# Patient Record
Sex: FEMALE | Race: WHITE | HISPANIC OR LATINO | Employment: UNEMPLOYED | URBAN - METROPOLITAN AREA
[De-identification: names, ages, dates, MRNs, and addresses within clinical notes are randomized per-mention and may not be internally consistent; named-entity substitution may affect disease eponyms.]

---

## 2022-07-12 ENCOUNTER — GYNECOLOGY VISIT (OUTPATIENT)
Dept: OBGYN | Facility: CLINIC | Age: 32
End: 2022-07-12

## 2022-07-12 ENCOUNTER — APPOINTMENT (OUTPATIENT)
Dept: OBGYN | Facility: CLINIC | Age: 32
End: 2022-07-12

## 2022-07-12 VITALS — SYSTOLIC BLOOD PRESSURE: 114 MMHG | DIASTOLIC BLOOD PRESSURE: 70 MMHG | WEIGHT: 188.8 LBS

## 2022-07-12 DIAGNOSIS — N93.8 DUB (DYSFUNCTIONAL UTERINE BLEEDING): ICD-10-CM

## 2022-07-12 LAB
INT CON NEG: NEGATIVE
INT CON POS: POSITIVE
POC URINE PREGNANCY TEST: POSITIVE

## 2022-07-12 PROCEDURE — 81025 URINE PREGNANCY TEST: CPT | Performed by: OBSTETRICS & GYNECOLOGY

## 2022-07-12 PROCEDURE — 99203 OFFICE O/P NEW LOW 30 MIN: CPT | Performed by: OBSTETRICS & GYNECOLOGY

## 2022-07-12 NOTE — PROGRESS NOTES
Patient here for GYN/DUB.  UPT= positive  LMP= 04/24/2022  RAYO= 01/29/2023  GA= 11w2d  Last pap patient states about 1 year ago at the [facility] [place]  Phone number:   Pharmacy verified  C/o patient states no concerns at the moment.

## 2022-07-12 NOTE — PROGRESS NOTES
Cc: Confirmation of pregnancy    HPI:  The patient is a 32 y.o.  at 11 weeks 2 days by last menstrual cycle of 2022.  She has a history of what appears to be 1 prior spontaneous  delivery at 32 weeks gestation.  This occurred 13 years ago.      She presents for a confirmation of pregnancy.  She denies  fetal movement,  denies  vaginal bleeding,  denies  leakage of fluid,  denies contractions.   She denies nausea/vomiting, denies headache, and denies dysuria.      Review of systems:  Pertinent positives documented in HPI and all other systems reviewed & are negative    OB History    Para Term  AB Living   2 1   1   1   SAB IAB Ectopic Molar Multiple Live Births             1      # Outcome Date GA Lbr Luis/2nd Weight Sex Delivery Anes PTL Lv   2 Current            1  09 32w0d  2 kg (4 lb 6.6 oz) M Vag-Spont   CHIOMA     History reviewed. No pertinent past medical history.  History reviewed. No pertinent surgical history.  Social History     Socioeconomic History   • Marital status:      Spouse name: Not on file   • Number of children: Not on file   • Years of education: Not on file   • Highest education level: Not on file   Occupational History   • Not on file   Tobacco Use   • Smoking status: Never Smoker   • Smokeless tobacco: Never Used   Vaping Use   • Vaping Use: Never used   Substance and Sexual Activity   • Alcohol use: Never   • Drug use: Never   • Sexual activity: Yes     Partners: Male   Other Topics Concern   • Not on file   Social History Narrative   • Not on file     Social Determinants of Health     Financial Resource Strain: Not on file   Food Insecurity: Not on file   Transportation Needs: Not on file   Physical Activity: Not on file   Stress: Not on file   Social Connections: Not on file   Intimate Partner Violence: Not on file   Housing Stability: Not on file     Family History   Problem Relation Age of Onset   • Diabetes Mother    • No Known Problems  Father    • No Known Problems Sister    • No Known Problems Brother    • No Known Problems Brother    • No Known Problems Brother    • No Known Problems Brother      Allergies:   Allergies as of 2022   • (No Known Allergies)       PE:    /70   Wt 85.6 kg (188 lb 12.8 oz)       General:appears stated age, is in no apparent distress  Head: normocephalic, non-tender  Neck: no jugular venous distension  Abdomen: Bowel sounds positive, nondistended, soft, nontender x4, no rebound or guarding. No organomegaly. No masses.  Female GYN: normal external genitalia, no erythema, no discharge  Skin: No rashes, or ulcers or lesions seen  Psychiatric: Patient shows appropriate affect, is alert and oriented x3, intact judgment and insight.    Transvaginal US performed and per my read:    Indication: Dating.     Findings: ott intrauterine pregnancy @11 weeks by CRL. Positive yolk sac. Positive fetal cardiac activity @170 BPM. Right ovary normal. Left Ovary normal. Cervical length 4.1 cm. No free fluid in the cul-de-sac.    Impression: viable IUP @11 weeks. EDC by US of 2023      A/P:   1. DUB (dysfunctional uterine bleeding)  POCT Pregnancy       1. Spent 30 minutes in face-to-face patient contact in which greater than 50% of that visit was spent in counseling/coordination of care of newly diagnosed pregnancy including medical and surgical options of care.  2.  We will discuss trimester screening at next visit  3.  SAB precautions discussed  4.  F/u in 3 weeks for new OB visit  5.  Increase water intake and encouraged healthy nutrition.  6.  Begin prenatal vitamins.    Referral to [facility] made secondary to history of spontaneous  birth.  May be candidate for progesterone supplementation during this pregnancy    Final due date 2023, consistent with today's ultrasound and last menstrual cycle

## 2022-07-14 ENCOUNTER — OFFICE VISIT (OUTPATIENT)
Dept: OBGYN | Facility: CLINIC | Age: 32
End: 2022-07-14

## 2022-08-12 ENCOUNTER — INITIAL PRENATAL (OUTPATIENT)
Dept: OBGYN | Facility: CLINIC | Age: 32
End: 2022-08-12

## 2022-08-12 VITALS
SYSTOLIC BLOOD PRESSURE: 98 MMHG | WEIGHT: 187.2 LBS | DIASTOLIC BLOOD PRESSURE: 70 MMHG | HEIGHT: 61 IN | BODY MASS INDEX: 35.34 KG/M2

## 2022-08-12 DIAGNOSIS — O09.92 SUPERVISION OF HIGH RISK PREGNANCY IN SECOND TRIMESTER: ICD-10-CM

## 2022-08-12 PROCEDURE — 8198 PREG CTR PKG RATE (SYSTEM)

## 2022-08-12 PROCEDURE — 0500F INITIAL PRENATAL CARE VISIT: CPT

## 2022-08-12 NOTE — PROGRESS NOTES
CC: New Ob visit     Subjective Christie Ang  15w5d by LMP= 11w US presents for prenatal care. Today is her first prenatal visit at [facility].   I have reviewed the patients' medical, surgical, gynecological, obstetrical, social, and family histories, medications and available lab results and pertinent notes are as follows:   no ER visits  No previous care in this pregnancy.   She has no complaints today.    Genetic screening options: Declines AFP.  Declines carrier screening. Declines NIPTs.    Reports no FM yet in the pregnancy. Denies VB, LOF, or cramping.  Denies dysuria, vaginal DC.    Pt is  and lives with FOB and older son (11yo). FOB is involved and supportive. 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    Pregnancy is planned and desired.      OB History    Para Term  AB Living   2 1 0 1 0 1   SAB IAB Ectopic Molar Multiple Live Births   0 0 0 0 0 1       Past Gynecological History:  Denies fibroids, ovarian cysts, abnormal pap smears, STDs. She denies ever having surgery on her cervix, uterus, or ovaries in the past.    Last pap smear was  - reports normal with no hx abnormal, records request sent  Past OB hx includes 1  birth in 2009 secondary to spontaneous  labor.   History of HSV I or II in self or partner: denies  History of STIs in self or partner: denies  History of Thyroid problems: denies    History of depression or anxiety no, EPDS today = 2      History reviewed. No pertinent past medical history.  History reviewed. No pertinent surgical history.   Social History     Socioeconomic History    Marital status:      Spouse name: Not on file    Number of children: Not on file    Years of education: Not on file    Highest education level: Not on file   Occupational History    Not on file   Tobacco Use    Smoking status: Never    Smokeless tobacco: Never   Vaping Use     Vaping Use: Never used   Substance and Sexual Activity    Alcohol use: Never    Drug use: Never    Sexual activity: Yes     Partners: Male     Birth control/protection: Implant     Comment: implant removed 2021   Other Topics Concern    Not on file   Social History Narrative    Not on file     Social Determinants of Health     Financial Resource Strain: Not on file   Food Insecurity: Not on file   Transportation Needs: Not on file   Physical Activity: Not on file   Stress: Not on file   Social Connections: Not on file   Intimate Partner Violence: Not on file   Housing Stability: Not on file     Family History:   Family History   Problem Relation Age of Onset    Diabetes Mother     No Known Problems Father     No Known Problems Sister     No Known Problems Brother     No Known Problems Brother     No Known Problems Brother     No Known Problems Brother        Genetic Screening/Teratology Counseling- Includes patient, baby's father, or anyone in either family with:  Patient's age 35 years or older as of estimated date of delivery: No     Thalassemia (Italian, Greek, Mediterranean, or  background): MCV less than 80: No     Neural tube defect (Meningomyelocele, Spina bifida, or Anencephaly): No     Congenital heart defect: No     Down syndrome: No     Mynor-Sachs (Ashkenazi Adventist, Cajun, Peruvian [place]): No     Canavan disease (Ashkenazi Adventist): No     Familial dysautonomia (Ashkenazi Adventist): No     Sickle cell disease or trait (): No     Hemophilia or other blood disorders: No     Muscular dystrophy: No    Cystic fibrosis: No     [place]'s chorea: No     Mental retardation/autism: No     Other inherited genetic or chromosomal disorder: No     Maternal metabolic disorder (eg. Type 1 diabetes, PKU): No     Patient or baby's father had child with birth defects not listed above: No     Recurrent pregnancy loss, or a stillbirth: No     Medications (including supplements, vitamins, herbs, or OTC  "drugs)/illicit/recreational drugs/alcohol since last menstrual period: No             Infection Prevention  1. High Risk For HIV: No 6. Rash Or Illness Since LMP: No     2. High Risk For Hepatitis B or C: No 7. History Of STD, GC, Chlamydia, HPV Syphilis: No     3. Live With Someone With TB Or Exposed To TB: No 8. Have a cat in the home?: No     4. Patient Or Partner Has A History Of Herpes: No 8a. Responsible for changing the litter?: No     5. History of Chicken Pox: No 9. Other (See Comments Below): No   Comments: Pregnancy was planned, FOB involved and supportive, FOB same as first child, Pt does not work.           Objective:   Allergies: Patient has no known allergies.  Objective:BP (!) 98/70   Ht 1.549 m (5' 1\")   Wt 84.9 kg (187 lb 3.2 oz)      Prenatal Physical:  General Exam:  HEENT: normal    Heart: normal    Thyroid: normal    Lungs: normal    Lymph Nodes: normal    Breasts comment:  Small amount of areolar irritation noted, pt state it is recent onset. Inverted nipple noted. O/w WNL  Neurological: normal    Abdomen: normal    Skin: normal    Extremities: normal    Pelvic Exam:  Uterus (wks):  16     Lab: No results found for this or any previous visit (from the past 672 hour(s)).    Ultrasound:  Reviewed initial scan and RAYO is by LMP c/w 11wk US    Assessment:  --Normal Exam at 15 weeks and 5d  --Size consistent with dates  --FHR positive  Encounter Diagnoses   Name Primary?    Supervision of high risk pregnancy in second trimester       infant with birth weight of 1,500 to 1,749 grams and 32 completed weeks of gestation         Plan:  Reviewed the patients risk factors for this pregnancy and recommend the need for   Complete OB US in: 4-5wks  Additional labs/imaging: referral to [facility] for evaluation of PTB risk  Antepartum fetal monitoring requirements: likely will need serial cervical lengths, pending evaluation by perinatology  2. Genetic screening was discussed with literature on " Prenatal Screening, Cystic Fibrosis, and SMA provided and the patient plans to:  - declined MSAFP  - declined carrier testing  - declined NIPTs  3. Discuss importance of adequate water intake, taking PNV, healthy nutritional choices, and avoidence of ETOH/Tobacco/drugs  4. Discuss importance of exercise, as well as rest   5. If has nausea, take Vitamin B6 25mg TID with doxylamine/Unisom 25mg at night  6. Prenatal labs and UDS ordered - lab slip given  7. Discussed OB care at [facility] including midwifery care, group practice, and call schedules  8. GC/CT ordered today via urine.    9. Pap UTD, records request sent.  10. Pregnancy guide provided and reviewed safe medications in pregnancy page  11. Follow up in  4 weeks for next visit and PRN    Urszula Willis C.N.M.

## 2022-08-12 NOTE — PROGRESS NOTES
Pt. Here for NOB visit today.   LMP 4/24/2022  Last pap: 2021 Done at [facility]  #[phone] (home)    First prenatal care  Pt. States no complaints or concerns  Pt declines VB, LOF, Cramping or contractions  Pharmacy verified  AFP Declined  1st tri lab orders ordered

## 2022-08-25 ENCOUNTER — HOSPITAL ENCOUNTER (OUTPATIENT)
Dept: LAB | Facility: MEDICAL CENTER | Age: 32
End: 2022-08-25
Payer: COMMERCIAL

## 2022-08-25 DIAGNOSIS — O09.92 SUPERVISION OF HIGH RISK PREGNANCY IN SECOND TRIMESTER: ICD-10-CM

## 2022-08-25 LAB
ABO GROUP BLD: NORMAL
BASOPHILS # BLD AUTO: 0.4 % (ref 0–1.8)
BASOPHILS # BLD: 0.02 K/UL (ref 0–0.12)
BLD GP AB SCN SERPL QL: NORMAL
C TRACH DNA SPEC QL NAA+PROBE: NEGATIVE
EOSINOPHIL # BLD AUTO: 0.02 K/UL (ref 0–0.51)
EOSINOPHIL NFR BLD: 0.4 % (ref 0–6.9)
ERYTHROCYTE [DISTWIDTH] IN BLOOD BY AUTOMATED COUNT: 43.6 FL (ref 35.9–50)
HBV SURFACE AG SER QL: NORMAL
HCT VFR BLD AUTO: 37.4 % (ref 37–47)
HCV AB SER QL: NORMAL
HGB BLD-MCNC: 12.9 G/DL (ref 12–16)
HIV 1+2 AB+HIV1 P24 AG SERPL QL IA: NORMAL
IMM GRANULOCYTES # BLD AUTO: 0.02 K/UL (ref 0–0.11)
IMM GRANULOCYTES NFR BLD AUTO: 0.4 % (ref 0–0.9)
LYMPHOCYTES # BLD AUTO: 1.74 K/UL (ref 1–4.8)
LYMPHOCYTES NFR BLD: 30.5 % (ref 22–41)
MCH RBC QN AUTO: 29.2 PG (ref 27–33)
MCHC RBC AUTO-ENTMCNC: 34.5 G/DL (ref 33.6–35)
MCV RBC AUTO: 84.6 FL (ref 81.4–97.8)
MONOCYTES # BLD AUTO: 0.31 K/UL (ref 0–0.85)
MONOCYTES NFR BLD AUTO: 5.4 % (ref 0–13.4)
N GONORRHOEA DNA SPEC QL NAA+PROBE: NEGATIVE
NEUTROPHILS # BLD AUTO: 3.59 K/UL (ref 2–7.15)
NEUTROPHILS NFR BLD: 62.9 % (ref 44–72)
NRBC # BLD AUTO: 0 K/UL
NRBC BLD-RTO: 0 /100 WBC
PLATELET # BLD AUTO: 260 K/UL (ref 164–446)
PMV BLD AUTO: 10.9 FL (ref 9–12.9)
RBC # BLD AUTO: 4.42 M/UL (ref 4.2–5.4)
RH BLD: NORMAL
RUBV AB SER QL: 39.2 IU/ML
SPECIMEN SOURCE: NORMAL
T PALLIDUM AB SER QL IA: NORMAL
WBC # BLD AUTO: 5.7 K/UL (ref 4.8–10.8)

## 2022-08-27 LAB
AMPHET CTO UR CFM-MCNC: NEGATIVE NG/ML
BACTERIA UR CULT: NORMAL
BARBITURATES CTO UR CFM-MCNC: NEGATIVE NG/ML
BENZODIAZ CTO UR CFM-MCNC: NEGATIVE NG/ML
CANNABINOIDS CTO UR CFM-MCNC: NEGATIVE NG/ML
COCAINE CTO UR CFM-MCNC: NEGATIVE NG/ML
DRUG COMMENT 753798: NORMAL
METHADONE CTO UR CFM-MCNC: NEGATIVE NG/ML
OPIATES CTO UR CFM-MCNC: NEGATIVE NG/ML
PCP CTO UR CFM-MCNC: NEGATIVE NG/ML
PROPOXYPH CTO UR CFM-MCNC: NEGATIVE NG/ML
SIGNIFICANT IND 70042: NORMAL
SITE SITE: NORMAL
SOURCE SOURCE: NORMAL

## 2022-09-08 ENCOUNTER — ROUTINE PRENATAL (OUTPATIENT)
Dept: OBGYN | Facility: CLINIC | Age: 32
End: 2022-09-08

## 2022-09-08 ENCOUNTER — HOSPITAL ENCOUNTER (OUTPATIENT)
Dept: LAB | Facility: MEDICAL CENTER | Age: 32
End: 2022-09-08
Attending: NURSE PRACTITIONER
Payer: COMMERCIAL

## 2022-09-08 VITALS — WEIGHT: 187.6 LBS | DIASTOLIC BLOOD PRESSURE: 64 MMHG | BODY MASS INDEX: 35.45 KG/M2 | SYSTOLIC BLOOD PRESSURE: 118 MMHG

## 2022-09-08 DIAGNOSIS — O26.892 RH NEGATIVE STATUS DURING PREGNANCY IN SECOND TRIMESTER: ICD-10-CM

## 2022-09-08 DIAGNOSIS — Z87.51 HISTORY OF PRETERM DELIVERY: ICD-10-CM

## 2022-09-08 DIAGNOSIS — Z67.91 RH NEGATIVE STATUS DURING PREGNANCY IN SECOND TRIMESTER: ICD-10-CM

## 2022-09-08 PROBLEM — O26.899 RH NEGATIVE STATUS DURING PREGNANCY: Status: ACTIVE | Noted: 2022-09-08

## 2022-09-08 PROCEDURE — 90040 PR PRENATAL FOLLOW UP: CPT | Performed by: NURSE PRACTITIONER

## 2022-09-08 NOTE — PROGRESS NOTES
Pt here today for OB follow up  Reports light FM  WT: 187.6  BP: 118/64  Preferred pharmacy verified with pt.  Pt states no complaints or concerns today  Desires [facility]  US on 09/15/22  Good # [phone]

## 2022-09-08 NOTE — PROGRESS NOTES
SUBJECTIVE:  Pt is a 32 y.o.   at 19w4d  gestation. Presents today for follow-up prenatal care. Reports no issues at this time.  Reports light  fetal movement. Denies regular cramping/contractions, bleeding or leaking of fluid. Denies dysuria, headaches, N/V. Generally feels well today.      OBJECTIVE:  - See prenatal vitals flow  -   Vitals:    22 1006   BP: 118/64   Weight: 85.1 kg (187 lb 9.6 oz)                 ASSESSMENT:   - IUP at 19w4d    - S=D   -   Patient Active Problem List    Diagnosis Date Noted    History of  delivery at 32 weeks 2022    Rh negative status during pregnancy 2022         PLAN:  - S/sx pregnancy and labor warning signs vs general discomforts discussed  - Fetal movements and/or kick counts reviewed   - Adequate hydration reinforced  - Nutrition/exercise/vitamin education; continue PNV  - Pt to call [facility] today and if issues with appt to call us  - US scheduled here  - Desires AFP declines NIPT  - Desires Esha so form submitted   - Labs WNL although pt thought she was B +  - Anticipatory guidance given  - RTC in 4 weeks for follow-up prenatal care

## 2022-09-09 ENCOUNTER — TELEPHONE (OUTPATIENT)
Dept: OBGYN | Facility: CLINIC | Age: 32
End: 2022-09-09

## 2022-09-09 NOTE — TELEPHONE ENCOUNTER
Tabatha ramos Burchinal called stating that she needs some boxes on the form to be marked off that they received. To start processing the RX.     Tabatha guided me through the process on which boxes  to sami off.     Informed Tabatha that I will fax over the form again.     She understood.

## 2022-09-12 LAB
# FETUSES US: NORMAL
AFP MOM SERPL: 1.02
AFP SERPL-MCNC: 49 NG/ML
AGE - REPORTED: 32.8 YR
CURRENT SMOKER: NO
FAMILY MEMBER DISEASES HX: NO
GA METHOD: NORMAL
GA: NORMAL WK
HCG MOM SERPL: 1.6
HCG SERPL-ACNC: NORMAL IU/L
HX OF HEREDITARY DISORDERS: NO
IDDM PATIENT QL: NO
INHIBIN A MOM SERPL: 1.04
INHIBIN A SERPL-MCNC: 158 PG/ML
INTEGRATED SCN PATIENT-IMP: NORMAL
PATHOLOGY STUDY: NORMAL
SPECIMEN DRAWN SERPL: NORMAL
U ESTRIOL MOM SERPL: 0.81
U ESTRIOL SERPL-MCNC: 1.7 NG/ML

## 2022-09-22 ENCOUNTER — APPOINTMENT (OUTPATIENT)
Dept: RADIOLOGY | Facility: IMAGING CENTER | Age: 32
End: 2022-09-22

## 2022-09-22 DIAGNOSIS — O09.92 SUPERVISION OF HIGH RISK PREGNANCY IN SECOND TRIMESTER: ICD-10-CM

## 2022-09-22 PROCEDURE — 76805 OB US >/= 14 WKS SNGL FETUS: CPT | Mod: TC | Performed by: NURSE PRACTITIONER

## 2022-10-03 ENCOUNTER — TELEPHONE (OUTPATIENT)
Dept: OBGYN | Facility: CLINIC | Age: 32
End: 2022-10-03

## 2022-10-03 NOTE — TELEPHONE ENCOUNTER
Tabatha from [employer] - representative called stating she is wondering why our office has not administered the RX [place] to patient since they shipped out Rx for her on and per Tabatha Rx arrived to our office on 9/13/2022.     Tabatha states they called patient to make sure she received Esha but patient informed Tabatha that she has not.     I put Tabatha on hold to get more information from [facility] as patient is seen there.     Spoke with Pinky RAVI and per Pinky she was not aware of a Esha in office. Pinky went to take a look and did see a Esha stored. Per Pinky someone must have stored the Esha without notifying her or the patient of Rx in office to be administered.      Informed Tabatha of the above and she understood.      Tabatha states since patient has not received Rx and she is past due to get this medication they would have to close the case with this patient. Tabatha states patient can no longer qualify for any medication or help from them. And strongly suggest Pt to get enrolled in medicaid if Rx is needed.    Informed Tabatha I will document and escalate this to my Seniors    She understood and no further questions asked.

## 2022-10-04 ENCOUNTER — TELEPHONE (OUTPATIENT)
Dept: OBGYN | Facility: CLINIC | Age: 32
End: 2022-10-04

## 2022-10-04 NOTE — TELEPHONE ENCOUNTER
Pt informed of yesterdays call pt aware that she should of received Esha injection earlier in pregnancy.     Pt would like to know whats the next step.     Pt has appt this Thrusday and will discuss more regarding Rx with provider.     Pt understood

## 2022-10-06 ENCOUNTER — ROUTINE PRENATAL (OUTPATIENT)
Dept: OBGYN | Facility: CLINIC | Age: 32
End: 2022-10-06

## 2022-10-06 VITALS — DIASTOLIC BLOOD PRESSURE: 60 MMHG | WEIGHT: 190.8 LBS | SYSTOLIC BLOOD PRESSURE: 116 MMHG | BODY MASS INDEX: 36.05 KG/M2

## 2022-10-06 DIAGNOSIS — O26.892 RH NEGATIVE STATUS DURING PREGNANCY IN SECOND TRIMESTER: ICD-10-CM

## 2022-10-06 DIAGNOSIS — Z67.91 RH NEGATIVE STATUS DURING PREGNANCY IN SECOND TRIMESTER: ICD-10-CM

## 2022-10-06 DIAGNOSIS — O09.92 SUPERVISION OF HIGH RISK PREGNANCY IN SECOND TRIMESTER: ICD-10-CM

## 2022-10-06 PROCEDURE — 90040 PR PRENATAL FOLLOW UP: CPT

## 2022-10-06 PROCEDURE — 90686 IIV4 VACC NO PRSV 0.5 ML IM: CPT

## 2022-10-06 PROCEDURE — 90471 IMMUNIZATION ADMIN: CPT

## 2022-10-06 RX ORDER — HYDROXYPROGESTERONE CAPROATE 250 MG/ML
INJECTION INTRAMUSCULAR
Qty: 0.98 ML | Status: CANCELLED
Start: 2022-10-06

## 2022-10-06 NOTE — PROGRESS NOTES
Pt here today for OB follow up  Reports +FM  WT: 190.8 lb  BP: 116/60  Preferred pharmacy verified with pt.  Pt states no complaints or concerns today  Pt desires the influenza vaccine  Good # [phone]    Pt received the influenza vaccine today 10/06/22. Right Deltoid

## 2022-10-06 NOTE — PROGRESS NOTES
S:  Christie here with  for routine prenatal follow up.  Reports good FM.  Denies VB, RUCs, LOF or vaginal DC.  Has appt with Dr. Simmons scheduled for 10/20/22.     O:    Vitals:    10/06/22 1115   BP: 116/60   Weight: 190 lb 12.8 oz   See flow sheet.    A:  IUP at 23w4d  S=D  Patient Active Problem List    Diagnosis Date Noted    History of  delivery at 32 weeks 2022    Rh negative status during pregnancy 2022       P:  1. Questions answered.           2. Encouraged adequate water intake        3.   labor precautions reviewed.        4.  F/u 4 wks and PRN        5.  28wk labs ordered and instructions provided        6.  Received flu vaccine today        7.  Recheck blood type and AB screen d/t patient previously told she was Rh+. Will plan Rhogam next appt if negative Rh status    Orders Placed This Encounter    INFLUENZA VACCINE QUAD INJ (PF)    RPR (SYPHILIS)    CBC WITHOUT DIFFERENTIAL    GLUCOSE 1HR GESTATIONAL    ANTIBODY SCREEN    ABO AND RH DETERMINATION      Urszula Willis C.N.M.

## 2022-10-27 ENCOUNTER — HOSPITAL ENCOUNTER (OUTPATIENT)
Dept: LAB | Facility: MEDICAL CENTER | Age: 32
End: 2022-10-27
Payer: COMMERCIAL

## 2022-10-27 ENCOUNTER — APPOINTMENT (OUTPATIENT)
Dept: LAB | Facility: MEDICAL CENTER | Age: 32
End: 2022-10-27
Payer: COMMERCIAL

## 2022-10-27 DIAGNOSIS — O09.92 SUPERVISION OF HIGH RISK PREGNANCY IN SECOND TRIMESTER: ICD-10-CM

## 2022-10-27 LAB
ABO GROUP BLD: NORMAL
BLD GP AB SCN SERPL QL: NORMAL
ERYTHROCYTE [DISTWIDTH] IN BLOOD BY AUTOMATED COUNT: 39.4 FL (ref 35.9–50)
GLUCOSE 1H P 50 G GLC PO SERPL-MCNC: 140 MG/DL (ref 70–139)
HCT VFR BLD AUTO: 37.7 % (ref 37–47)
HGB BLD-MCNC: 13.1 G/DL (ref 12–16)
MCH RBC QN AUTO: 29.8 PG (ref 27–33)
MCHC RBC AUTO-ENTMCNC: 34.7 G/DL (ref 33.6–35)
MCV RBC AUTO: 85.7 FL (ref 81.4–97.8)
PLATELET # BLD AUTO: 225 K/UL (ref 164–446)
PMV BLD AUTO: 11.2 FL (ref 9–12.9)
RBC # BLD AUTO: 4.4 M/UL (ref 4.2–5.4)
RH BLD: NORMAL
T PALLIDUM AB SER QL IA: NORMAL
WBC # BLD AUTO: 6.7 K/UL (ref 4.8–10.8)

## 2022-10-31 ENCOUNTER — TELEPHONE (OUTPATIENT)
Dept: OBGYN | Facility: CLINIC | Age: 32
End: 2022-10-31

## 2022-10-31 DIAGNOSIS — R73.09 ELEVATED GLUCOSE TOLERANCE TEST: ICD-10-CM

## 2022-10-31 NOTE — TELEPHONE ENCOUNTER
----- Message from Urszula Willis C.N.M. sent at 10/31/2022  7:37 AM PDT -----  Please call for the following:   - elevated 1hr GTT, needs 3hr. This has been ordered.   - she is Rh negative. We will administer rhogam at ~28wks  Otherwise all other labs WNL    10/31/22  11:28 AM   service used, ID #[id number]  Pt notified of abnormal 1hr gtt and need to do 3hr gtt this time. Pt instructed to fast 10-12 hours prior to testing. Pt informed she is only allow to drink plain water during fasting time. Pt will call lab to schedule an appt. Advised to bring a snack for after the test is done. Pt notified will be staying in the labs for the 3hr. Also informed patient of Rh- and need for rhogam at 28 weeks.  Answered all questions. Pt verbalized understanding.

## 2022-11-03 ENCOUNTER — ROUTINE PRENATAL (OUTPATIENT)
Dept: OBGYN | Facility: CLINIC | Age: 32
End: 2022-11-03

## 2022-11-03 ENCOUNTER — HOSPITAL ENCOUNTER (OUTPATIENT)
Dept: LAB | Facility: MEDICAL CENTER | Age: 32
End: 2022-11-03
Payer: COMMERCIAL

## 2022-11-03 VITALS — DIASTOLIC BLOOD PRESSURE: 68 MMHG | BODY MASS INDEX: 35.75 KG/M2 | WEIGHT: 189.2 LBS | SYSTOLIC BLOOD PRESSURE: 108 MMHG

## 2022-11-03 DIAGNOSIS — Z67.91 RH NEGATIVE STATE IN ANTEPARTUM PERIOD: ICD-10-CM

## 2022-11-03 DIAGNOSIS — O26.899 RH NEGATIVE STATE IN ANTEPARTUM PERIOD: ICD-10-CM

## 2022-11-03 DIAGNOSIS — R73.09 ELEVATED GLUCOSE TOLERANCE TEST: ICD-10-CM

## 2022-11-03 DIAGNOSIS — Z87.51 HISTORY OF PRETERM DELIVERY: ICD-10-CM

## 2022-11-03 DIAGNOSIS — O09.893 SUPERVISION OF OTHER HIGH RISK PREGNANCIES, THIRD TRIMESTER: ICD-10-CM

## 2022-11-03 LAB
GLUCOSE 1H P CHAL SERPL-MCNC: 190 MG/DL (ref 65–180)
GLUCOSE 2H P CHAL SERPL-MCNC: 184 MG/DL (ref 65–155)
GLUCOSE 3H P CHAL SERPL-MCNC: 142 MG/DL (ref 65–140)
GLUCOSE BS SERPL-MCNC: 104 MG/DL (ref 65–95)

## 2022-11-03 PROCEDURE — 90471 IMMUNIZATION ADMIN: CPT

## 2022-11-03 PROCEDURE — 0502F SUBSEQUENT PRENATAL CARE: CPT

## 2022-11-03 PROCEDURE — 96372 THER/PROPH/DIAG INJ SC/IM: CPT

## 2022-11-03 PROCEDURE — 90715 TDAP VACCINE 7 YRS/> IM: CPT

## 2022-11-03 NOTE — LETTER
Cuente los Movimientos de nair Bebé  Otro paso importante para la iris de nair bebé    Christie Ang     [facility]            Dept: [phone]    ¿Cuántas semanas tiene de embarazo? 27w4d    Fecha cuando tiene que comenzar a contar el movimiento: 11/6/2022                  [place] debe usar kenneth diagrama    Sharmila manera en que nair doctor puede controlar a iris de nair bebé es sabiendo cuantas veces se mueve nair bebé en el útero, o por medio de las “pataditas”.  Usted podrá ayudarle a nair médico al usar cada día el siguiente diagrama.    Cada día, usted debe prestar atención a cuantas horas le lleva a nair bebé moverse 10 veces.  Comience a contar en la mañana, lo antes posible después de haberse levantado.    · Primeramente, escriba la hora en que se mueve nair bebé, hasta llegar a 10 veces.  · Colóquele un check o palomita a cada cuadrito cada vez que nair bebé se mueva hasta que complete 10 veces.  · Escriba la hora cuando termine de contar 10 veces en la última columna.  · Sume el total del tiempo que le llevó contar los 10 movimientos.  · Finalmente, complete el cuadrito de cuantas horas le llevó hacerlo.    Después de allison contado los 10 movimientos, ya no tendrá que contar los demás movimientos por el gerson del día.  A la mañana siguiente, comience a contar de nuevo cuantas veces se mueve el bebé desde el momento en que se levante.    ¿Qué tendría que considerarse un “movimiento”?  Es difícil de decirlo porque es distinto de sharmila madre a otra, y de un embarazo a otro.  Lo importante es que cuente el movimiento de la misma manera anthony el transcurso de nair embarazo.  Si tiene preguntas adicionales, pregúntele a nair doctor.    ¡Cuente cuidadosamente cada día!     MUESTRA:  Semana 28    ¿Cuántas horas le ha llevado sentir 10 movimientos?        Hora de Inicio     1     2     3     4     5     6     7     8     9     10   Hora de Finlizar   Dewayne. 8:20 ·  ·  ·  ·  ·  ·  ·  ·  ·  ·   11:40   Mar.               Mié.               Za.               Taylere.               Sáb.               Dom.                 IMPORTANTE:  Usted debe contactar a nair doctor si le lleva más de 2 horas sentir 10 movimientos de nair bebé.    Cada mañana, escriba la hora de inicio y comience a contar los movimientos de nair bebé.  Hágalo colocándole un check o palomita a cada cuadrito cada vez que sienta un movimiento de nair bebé.  Cuando haya sentido 10 “pataditas”, escriba la hora en que terminó de contar en la última columna.  Luego, complete en la cajita (arriba de la lobito de check o palomita) el número total de horas que le llevó hacerlo.  Asegúrese de leer completamente las instrucciones en la página anterior.

## 2022-11-03 NOTE — PROGRESS NOTES
Subjective     Christie Ang is a 32 y.o. female who presents with No chief complaint on file.            HPI    ROS           Objective     /68   Wt 189 lb 3.2 oz   LMP 2022   BMI 35.75 kg/m²      Physical Exam                        Assessment & Plan        1. Rh negative state in antepartum period  ***  - Consent for Rhogam  - rho d immune globulin (RHOGAM) 1500 unit/2 mL    2. History of  delivery at 32 weeks  ***    3. Supervision of other high risk pregnancies, third trimester  ***  - Tdap Vaccine =>8YO IM

## 2022-11-03 NOTE — PROGRESS NOTES
Pt here for OB follow up.   FM active  FM count provided.   3HR GTT done today.   Dr. Louis apt 10/20 office visit, no follow up provided.   Preferred pharmacy verified.  RhoGAM today. Desires Tdap.   Good Phone # [phone]  Pt has no concerns or complaints today.   Contractions, LOF, VB pt denies.   Declined BTL

## 2022-11-03 NOTE — PROGRESS NOTES
RhoGAM injection administered to pt today per provider's orders.       RhoGAM injection administered today 2022  NDC: 39481-264-28  LOT#: [id number]  Expiration Date: 2024  Dose: 1500 IU  Site: Right Upper Outer Quadrant Gluteal  Patient educated on use and side effects of medication. Name and  verified prior to injection. Pt tolerated? Yes   Administered by Salty Veliz Ass'bhavya at 12:001 PM.  Patient Provided Medication: no

## 2022-11-04 ENCOUNTER — TELEPHONE (OUTPATIENT)
Dept: OBGYN | Facility: CLINIC | Age: 32
End: 2022-11-04

## 2022-11-04 ENCOUNTER — APPOINTMENT (OUTPATIENT)
Dept: OBGYN | Facility: CLINIC | Age: 32
End: 2022-11-04

## 2022-11-04 DIAGNOSIS — O24.419 GESTATIONAL DIABETES MELLITUS (GDM) IN SECOND TRIMESTER, GESTATIONAL DIABETES METHOD OF CONTROL UNSPECIFIED: ICD-10-CM

## 2022-11-04 RX ORDER — LANCETS 30 GAUGE
EACH MISCELLANEOUS
Qty: 100 EACH | Refills: 0 | Status: SHIPPED | OUTPATIENT
Start: 2022-11-04

## 2022-11-04 RX ORDER — GLUCOSAMINE HCL/CHONDROITIN SU 500-400 MG
CAPSULE ORAL
Qty: 100 EACH | Refills: 0 | Status: SHIPPED | OUTPATIENT
Start: 2022-11-04

## 2022-11-04 NOTE — TELEPHONE ENCOUNTER
----- Message from Urszula Willis C.N.M. sent at 11/4/2022  3:05 AM PDT -----  Pt has GDM. Please notify her. I've ordered supplies and A1C. She will need GDM class ASAP.     Thanks      Pt notified of Dx of GDM and needs to go to GDM class and f/u at [facility] with MD. GDM class scheduled for 11/8/22 at 0830, explained class is 2hrs long and she needs to bring Relion glucose Premier glucose meter, Relion Premier test strips and lancets to the class and GDM f/u at [facility] on 11/17/2022 at 1400. Address to GDM class given to pt. Pt aware to bring in her log book and meter to GDM f/u with MD. Pt agreed and verbalized understanding.

## 2022-11-04 NOTE — PROGRESS NOTES
S: Christie here for routine prenatal follow up.  Reports good FM.  Denies VB, LOF, RUCs or vaginal DC. Unsure if she should receive rhogam. She states in [place] she was told she was Rh+ and never received rhogam. After review of benefits and lab results showing B negative, pt agrees to receive rhogam.     O:    Vitals:    22 1128   BP: 108/68   Weight: 189 lb 3.2 oz     See flow sheet    Third trimester labs:  1 hr GTT: elevated, pending 3hr GTT  RPR: NR  CBC 13.1/37.7, plt 225    A:    IUP at 27w5d  S=D  Patient Active Problem List    Diagnosis Date Noted    Supervision of other high risk pregnancies, third trimester 2022    Elevated glucose tolerance test 10/31/2022    History of  delivery at 32 weeks 2022    Rh negative status during pregnancy 2022          P:  1.  PP contraception plan: undecided.  Declines BTL.         2.  Instructions and handouts given on FKCs.          3.  Questions answered.          4.  Encourage adequate water intake.        5.  Follow up for third trimester labs: pending 3hr GTT.         6.   labor precautions reviewed.         7.  F/u 2 wks and PRN        8.  TDap administered today.  Orders Placed This Encounter    Tdap Vaccine =>6YO IM    rho d immune globulin (RHOGAM) 1500 unit/2 mL    Consent for Rhogam       Urszula Willis C.N.M.

## 2022-11-08 ENCOUNTER — NON-PROVIDER VISIT (OUTPATIENT)
Dept: OBGYN | Facility: CLINIC | Age: 32
End: 2022-11-08

## 2022-11-08 ENCOUNTER — HOSPITAL ENCOUNTER (OUTPATIENT)
Dept: LAB | Facility: MEDICAL CENTER | Age: 32
End: 2022-11-08
Payer: COMMERCIAL

## 2022-11-08 VITALS
HEIGHT: 61 IN | BODY MASS INDEX: 35.87 KG/M2 | WEIGHT: 190 LBS | HEART RATE: 93 BPM | SYSTOLIC BLOOD PRESSURE: 141 MMHG | DIASTOLIC BLOOD PRESSURE: 70 MMHG

## 2022-11-08 DIAGNOSIS — O24.419 GESTATIONAL DIABETES MELLITUS (GDM) IN SECOND TRIMESTER, GESTATIONAL DIABETES METHOD OF CONTROL UNSPECIFIED: ICD-10-CM

## 2022-11-08 DIAGNOSIS — O24.419 GESTATIONAL DIABETES MELLITUS (GDM) IN THIRD TRIMESTER, GESTATIONAL DIABETES METHOD OF CONTROL UNSPECIFIED: ICD-10-CM

## 2022-11-08 LAB
EST. AVERAGE GLUCOSE BLD GHB EST-MCNC: 103 MG/DL
HBA1C MFR BLD: 5.2 % (ref 4–5.6)

## 2022-11-08 PROCEDURE — G0109 DIAB MANAGE TRN IND/GROUP: HCPCS

## 2022-11-08 NOTE — LETTER
November 8, 2022                   Re: Christie Ang     1990         [id number]       Urszula CARREON.      Dear : Urszula Willis C.N.M.    On 11/8/2022, your patient Christie Ang, received 2 hours of nutrition and diabetes education from the [facility] at [facility] for management of her gestational diabetes.  Her EDC is  : 1/29/23.  We taught the following subjects:    Introduction to gestational diabetes, benefits and responsibilities of patient, physiology of diabetes and the diease process, benefits of blood glucose monitoring and record keeping, medication action and possible side effects, hypoglycemia, sick day management, exercise, stress reduction and travel with diabetes.       Nurse assessment / Education:    Comments:    BP:Blood Pressure: (!) 141/70   Edema:No      Weight:Weight: 86.2 kg (190 lb)         Complaints:No     Pathophysiology of diabetes in pregnancy    Discuss  potential maternal and fetal complications in pregnancy with diabetes.     Importance of blood glucose monitoring   Proper testing technique using a Accucheck Guide meter.    At 0906, the meter read 93, which was 1 hour after eating.  Testing: fasting and one hour after meals,  expected ranges and rationale for strict control.   Urine ketone testing and rationale    Ketone testing:  At the Pregnancy center  Ketone test today:No        Insulin taught: No  Insulin briefly dicussed at this time.    Should patient require insulin later in pregnancy, she would need further education.     Reviewed fetal kick counts and other tests to determine fetal well-being  Discuss benefits and risks of exercise in pregnancy  Discuss when to call Doctor  Discuss sick day care  Importance of wearing diabetes identification    Christie attended [place] Gestational Diabetes class. Pt was late as she had to stop at a pharmacy to get a new meter (  had thrown her Reli on meter away). Pt brought in an  Accucheck Guide meter and supplies. Pt was shown how to use with return demo blood sugar of 93, 1 hour post apple.  Pt will need a prescription for strips and lancets if she chooses to keep using the meter ( so she can access the coupon for $19.99 for 50 strips, and $29.99 for 100 strips. Pt was given and shown to use a Aster lancet device and 100 lancets ( pt if she chooses will only need buy a new Reli on and strips going forward).  Education on GDM, and meal plan was given by Kavita JIANG as pt was late and needed to diet first.  Pt was given a 2000 abdulaziz GDM meal plan, reviewed by Kavita JIANG, who will also scan meal plan into chart. Education given by Kavita JIANG    Patient/caregiver appeared to understand the content as demonstrated by appropriate questions.     Christie Ang was encouraged to discuss this further with you.    Hopefully this will help in your management of her care.  If we can be of further assistance, please feel free to call.    Thank you for the referral.    Sincerely,    Kayla Renteria RN CDE  Certified Diabetes Educator

## 2022-11-08 NOTE — PROGRESS NOTES
Christie attended [place] Gestational Diabetes class. Family Hx mom, aunt, and grandmother unknown type. Pt brought in a Accu-check Guide me glucose meter. Pt was taught verbally and hands on to use meter and finger stick done for a 93 blood sugar, 1 hour post apple.   Activity: no activity.  Discussed what she needs to do after delivery for herself and family to limit risk for type two diabetes. All education and handouts given in [place]  .   Pt here for GDM class-[place]. Discuss with pt sources of simple sugars, sources of complex carbs, Carb portions, Protains and fats, plate distribution.  The pt received a 2000 calorie meal plan (with verification of Kayla's D CDE/RN) consisting of 3 meals and 3 snacks (see media for copy of meal plan).   Recommended meal times:   B: 0700  S: 0930  L: 1200  S: 1500  D: 1800  S: 2100  Pt was educated on carbohydrate containing foods vs non carbohydrate containing foods, the importance of small frequent meals, limiting carbohydrate to 1 serving in the morning, no fruit before noon/12pm, and avoiding all concentrated sweets for the remainder of her pregnancy. Explained the importance of not going >3hrs btwn eating during the day and no longer than 10hours overnight. Patient agrees to follow the meal plan and guidelines provided.   All handouts were given in [place].

## 2022-11-08 NOTE — PROGRESS NOTES
Christie attended [facility] Gestational Diabetes class. Pt was late as she had to stop at a pharmacy to get a new meter (  had thrown her Reli on meter away). Pt brought in an Accucheck Guide meter and supplies. Pt was shown how to use with return demo blood sugar of 93, 1 hour post apple.  Pt will need a prescription for strips and lancets if she chooses to keep using the meter ( so she can access the coupon for $19.99 for 50 strips, and $29.99 for 100 strips. Pt was given and shown to use a Aster lancet device and 100 lancets ( pt if she chooses will only need buy a new Reli on and strips going forward).  Education given by Kavita JIANG.  Education on GDM, and meal plan was given by Kavita JIANG as pt was late and needed to diet first.  Pt was given a 2000 abdulaziz GDM meal plan, reviewed by Kavita JIANG, who will also scan meal plan into chart.

## 2022-11-17 ENCOUNTER — ROUTINE PRENATAL (OUTPATIENT)
Dept: OBGYN | Facility: CLINIC | Age: 32
End: 2022-11-17

## 2022-11-17 VITALS — BODY MASS INDEX: 35.71 KG/M2 | DIASTOLIC BLOOD PRESSURE: 72 MMHG | SYSTOLIC BLOOD PRESSURE: 100 MMHG | WEIGHT: 189 LBS

## 2022-11-17 DIAGNOSIS — R73.09 ELEVATED GLUCOSE TOLERANCE TEST: ICD-10-CM

## 2022-11-17 LAB — GLUCOSE BLD-MCNC: 109 MG/DL (ref 70–100)

## 2022-11-17 PROCEDURE — 99213 OFFICE O/P EST LOW 20 MIN: CPT | Performed by: OBSTETRICS & GYNECOLOGY

## 2022-11-17 PROCEDURE — 82962 GLUCOSE BLOOD TEST: CPT | Performed by: OBSTETRICS & GYNECOLOGY

## 2022-11-17 NOTE — PROGRESS NOTES
Pt here for diabetic OB exam  Pt states no complaints   Good# [phone]  +FM  Pharmacy confirmed   Chaperone offered not indicated  Diabetic supplies: None needed today   Random Accucheck: 109

## 2022-11-17 NOTE — NON-PROVIDER
SUBJECTIVE:  Christie is being seen today for her obstetrical visit.  She is 29w4d weeks gestation. She is Rh +. Her obstetrical history is significant for diabetes mellitus, gestational. Other risk factors include obesity.    OBJECTIVE:  On examination today, fundal height is 29cm, which is consistent with gestational age.   Fetal heart tones are at 135/minute.   Fasting blood sugars are running between 92-95.  Two-hour postprandial sugars are running between .  Today BG was 109. A1c was 5.2  Recent ultrasonography performed on 9/22/2022 is CWD.   Estimated fetal weight is 465 gm with normal growth. No evidence of macrosomia.    Neg T. Pallidium, HepBsAg, Hep C antibody   Rubella IgG 39.20    ASSESSMENT/PLAN:  1. Intrauterine pregnancy of 29 weeks gestation, with normal growth.  2. Diabetes Mellitus with normal sugar control. No indication for insulin, continue diet control of BG.   3. Rhogam, influenza vaccine, AMARILIS and Tdap given  4. F/u every 2-3 weeks to monitor BG

## 2022-11-29 ENCOUNTER — APPOINTMENT (OUTPATIENT)
Dept: OBGYN | Facility: CLINIC | Age: 32
End: 2022-11-29

## 2022-12-01 ENCOUNTER — ROUTINE PRENATAL (OUTPATIENT)
Dept: OBGYN | Facility: CLINIC | Age: 32
End: 2022-12-01

## 2022-12-01 VITALS — WEIGHT: 190 LBS | BODY MASS INDEX: 35.9 KG/M2 | DIASTOLIC BLOOD PRESSURE: 60 MMHG | SYSTOLIC BLOOD PRESSURE: 116 MMHG

## 2022-12-01 DIAGNOSIS — E10.9 INSULIN DEPENDENT DIABETES MELLITUS TYPE IA (HCC): ICD-10-CM

## 2022-12-01 PROCEDURE — 99214 OFFICE O/P EST MOD 30 MIN: CPT | Performed by: OBSTETRICS & GYNECOLOGY

## 2022-12-01 NOTE — PROGRESS NOTES
SUBJECTIVE:  Christie is being seen today for her obstetrical visit.  She is 31 4/7 weeks gestation.  Her obstetrical history is significant for diabetes mellitus, gestational.    OBJECTIVE:  On examination today, fundal height is 32.  Fetal heart tones are at 155/minute.   Rhogam given 11/3  ASSESSMENT/PLAN:  1. Intrauterine pregnancy of 31 weeks gestation, with normal growth.  2. Diabetes Mellitus with normal sugar control.  3.  Growth scan at 36 weeks  4. Rhogam at delivery  5. RTC 2 weeks

## 2022-12-15 ENCOUNTER — ROUTINE PRENATAL (OUTPATIENT)
Dept: OBGYN | Facility: CLINIC | Age: 32
End: 2022-12-15

## 2022-12-15 VITALS — BODY MASS INDEX: 35.52 KG/M2 | SYSTOLIC BLOOD PRESSURE: 108 MMHG | DIASTOLIC BLOOD PRESSURE: 64 MMHG | WEIGHT: 188 LBS

## 2022-12-15 DIAGNOSIS — O24.419 GDM, CLASS A2: ICD-10-CM

## 2022-12-15 LAB
NST ACOUSTIC STIMULATION: NORMAL
NST ACTION NECESSARY: NORMAL
NST ASSESSMENT: NORMAL
NST BASELINE: NORMAL
NST INDICATIONS: NORMAL
NST OTHER DATA: NORMAL
NST READ BY: NORMAL
NST RETURN: NORMAL
NST UTERINE ACTIVITY: NORMAL

## 2022-12-15 PROCEDURE — 99213 OFFICE O/P EST LOW 20 MIN: CPT | Performed by: OBSTETRICS & GYNECOLOGY

## 2022-12-15 NOTE — PROGRESS NOTES
Pt here for diabetic OB exam  Pt states no complaints   Good# [phone]  +  Pharmacy confirmed   Chaperone offered not indicated  Diabetic supplies: None needed today   Pt made aware that she needed to schedule her growth u/s

## 2022-12-15 NOTE — PROGRESS NOTES
SUBJECTIVE:  Christie is being seen today for her obstetrical visit.  She is 33 weeks gestation.  Her obstetrical history is significant for diabetes mellitus, gestational.   OBJECTIVE:  On examination today, fundal height is 33.   Fetal heart tones are at 130/minute.   NST:reactive, with accelerations.  Fasting blood sugars are running between 95 and 102.  Two-hour postprandial sugars are rat target      ASSESSMENT/PLAN:  1. Intrauterine pregnancy of 33 weeks gestation, with normal growth.  2. Diabetes Mellitus with abnormal sugar control. I have added 6 units of NPH at night to bring her fasting down.  3.  NST: reassuring  4.  Growth scan at 36 weeks, NST weekly.

## 2022-12-15 NOTE — PROGRESS NOTES
Insulin instructions given to pt on 12/15/2022. Pt will start on 6 units of NPH, QHS. Pt instructed on how to draw up insulin, site selection and rotation, self injection technique, proper storage of disposal of syringes. Pt demonstrated back self injection technique successfully. Advised to follow really close her meal plan. Pt informed to place insulin in refrigerator, after opening insulin is good for 31days. Advised to write opened date on vial and discard after 31 days. Instruction booklet given. Will call back in case of any questions.

## 2022-12-19 ENCOUNTER — ROUTINE PRENATAL (OUTPATIENT)
Dept: OBGYN | Facility: CLINIC | Age: 32
End: 2022-12-19

## 2022-12-19 DIAGNOSIS — O24.419 GDM, CLASS A2: ICD-10-CM

## 2022-12-19 PROCEDURE — 59025 FETAL NON-STRESS TEST: CPT | Performed by: OBSTETRICS & GYNECOLOGY

## 2022-12-21 ENCOUNTER — APPOINTMENT (OUTPATIENT)
Dept: RADIOLOGY | Facility: IMAGING CENTER | Age: 32
End: 2022-12-21
Attending: OBSTETRICS & GYNECOLOGY

## 2022-12-21 DIAGNOSIS — E10.9 INSULIN DEPENDENT DIABETES MELLITUS TYPE IA (HCC): ICD-10-CM

## 2022-12-21 PROCEDURE — 76816 OB US FOLLOW-UP PER FETUS: CPT | Mod: TC | Performed by: PHYSICIAN ASSISTANT

## 2022-12-22 ENCOUNTER — ROUTINE PRENATAL (OUTPATIENT)
Dept: OBGYN | Facility: CLINIC | Age: 32
End: 2022-12-22

## 2022-12-22 VITALS — SYSTOLIC BLOOD PRESSURE: 122 MMHG | WEIGHT: 189 LBS | DIASTOLIC BLOOD PRESSURE: 71 MMHG | BODY MASS INDEX: 35.71 KG/M2

## 2022-12-22 DIAGNOSIS — O24.419 GDM, CLASS A2: ICD-10-CM

## 2022-12-22 LAB
GLUCOSE BLD-MCNC: 128 MG/DL (ref 70–100)
NST ACOUSTIC STIMULATION: NORMAL
NST ACTION NECESSARY: NORMAL
NST ASSESSMENT: NORMAL
NST BASELINE: NORMAL
NST INDICATIONS: NORMAL
NST OTHER DATA: NORMAL
NST READ BY: NORMAL
NST RETURN: NORMAL
NST UTERINE ACTIVITY: NORMAL

## 2022-12-22 PROCEDURE — 82962 GLUCOSE BLOOD TEST: CPT | Performed by: OBSTETRICS & GYNECOLOGY

## 2022-12-22 PROCEDURE — 99213 OFFICE O/P EST LOW 20 MIN: CPT | Performed by: OBSTETRICS & GYNECOLOGY

## 2022-12-22 NOTE — PROGRESS NOTES
SUBJECTIVE:  Christie is being seen today for her obstetrical visit.  She is 34 4/7 weeks gestation.  Her obstetrical history is significant for diabetes mellitus, on insulin, 6 U of NPH qhs.   OBJECTIVE:  On examination today, fundal height is 35.  Fetal heart tones are at 140/minute.   NST:reactive, with accelerations.  Fasting blood sugars are running between 90 to 95.  Two-hour postprandial sugars are at target range  Recent ultrasonography performed on 12/22 is CWD.   Estimated fetal weight is 2719gm with normal growth.        ASSESSMENT/PLAN:  1. Intrauterine pregnancy of 34 weeks gestation, with normal growth.  2. Diabetes Mellitus with normal sugar control. Continue 6 of NPH qhs  3.  NST: reassuring

## 2022-12-27 ENCOUNTER — ROUTINE PRENATAL (OUTPATIENT)
Dept: OBGYN | Facility: CLINIC | Age: 32
End: 2022-12-27

## 2022-12-27 DIAGNOSIS — O24.419 GDM, CLASS A2: ICD-10-CM

## 2022-12-27 PROCEDURE — 59025 FETAL NON-STRESS TEST: CPT | Performed by: OBSTETRICS & GYNECOLOGY

## 2022-12-29 ENCOUNTER — ROUTINE PRENATAL (OUTPATIENT)
Dept: OBGYN | Facility: CLINIC | Age: 32
End: 2022-12-29

## 2022-12-29 VITALS — BODY MASS INDEX: 36.28 KG/M2 | WEIGHT: 192 LBS | DIASTOLIC BLOOD PRESSURE: 62 MMHG | SYSTOLIC BLOOD PRESSURE: 113 MMHG

## 2022-12-29 DIAGNOSIS — O24.419 GDM, CLASS A2: ICD-10-CM

## 2022-12-29 LAB
GLUCOSE BLD-MCNC: 97 MG/DL (ref 70–100)
NST ACOUSTIC STIMULATION: NORMAL
NST ACTION NECESSARY: NORMAL
NST ASSESSMENT: NORMAL
NST BASELINE: NORMAL
NST INDICATIONS: NORMAL
NST OTHER DATA: NORMAL
NST READ BY: NORMAL
NST RETURN: NORMAL
NST UTERINE ACTIVITY: NORMAL

## 2022-12-29 PROCEDURE — 99213 OFFICE O/P EST LOW 20 MIN: CPT | Performed by: OBSTETRICS & GYNECOLOGY

## 2022-12-29 PROCEDURE — 82962 GLUCOSE BLOOD TEST: CPT | Performed by: OBSTETRICS & GYNECOLOGY

## 2022-12-29 NOTE — PROGRESS NOTES
SUBJECTIVE:  Christie is being seen today for her obstetrical visit.  She is 35 4/7 weeks gestation.  Her obstetrical history is significant for diabetes mellitus, on insulin, 6 U of NPH qhs.       OBJECTIVE:  On examination today, fundal height is s=d    NST:reactive, with accelerations.  Sugars are in target range  Recent ultrasonography performed on 12/21 is CWD.     ASSESSMENT/PLAN:  1. Intrauterine pregnancy of 35 weeks gestation, with normal growth.  2. Diabetes Mellitus with normal sugar control.  3.  NST: reassuring  4.  Plan for induction at 38 weeks, GBS next visit.

## 2022-12-29 NOTE — PROGRESS NOTES
Pt here for diabetic OB exam  Pt states no complaints   Good# [phone]  +FM  Pharmacy confirmed   Chaperone offered not indicated  Diabetic supplies: None needed today   Random Accucheck: 97

## 2023-01-03 ENCOUNTER — HOSPITAL ENCOUNTER (INPATIENT)
Facility: MEDICAL CENTER | Age: 33
LOS: 3 days | End: 2023-01-06
Attending: OBSTETRICS & GYNECOLOGY | Admitting: OBSTETRICS & GYNECOLOGY

## 2023-01-03 ENCOUNTER — ROUTINE PRENATAL (OUTPATIENT)
Dept: OBGYN | Facility: CLINIC | Age: 33
End: 2023-01-03

## 2023-01-03 DIAGNOSIS — O24.419 GDM, CLASS A2: ICD-10-CM

## 2023-01-03 DIAGNOSIS — Z87.51 HISTORY OF PRETERM DELIVERY: ICD-10-CM

## 2023-01-03 PROBLEM — O42.919 PRETERM PREMATURE RUPTURE OF MEMBRANES (PPROM) WITH UNKNOWN ONSET OF LABOR: Status: ACTIVE | Noted: 2023-01-03

## 2023-01-03 LAB
BASOPHILS # BLD AUTO: 0.3 % (ref 0–1.8)
BASOPHILS # BLD: 0.02 K/UL (ref 0–0.12)
EOSINOPHIL # BLD AUTO: 0.01 K/UL (ref 0–0.51)
EOSINOPHIL NFR BLD: 0.2 % (ref 0–6.9)
ERYTHROCYTE [DISTWIDTH] IN BLOOD BY AUTOMATED COUNT: 39.4 FL (ref 35.9–50)
GLUCOSE BLD STRIP.AUTO-MCNC: 86 MG/DL (ref 65–99)
GLUCOSE BLD STRIP.AUTO-MCNC: 93 MG/DL (ref 65–99)
HCT VFR BLD AUTO: 38.8 % (ref 37–47)
HGB BLD-MCNC: 13.4 G/DL (ref 12–16)
HOLDING TUBE BB 8507: NORMAL
IMM GRANULOCYTES # BLD AUTO: 0.01 K/UL (ref 0–0.11)
IMM GRANULOCYTES NFR BLD AUTO: 0.2 % (ref 0–0.9)
LYMPHOCYTES # BLD AUTO: 1.59 K/UL (ref 1–4.8)
LYMPHOCYTES NFR BLD: 25.8 % (ref 22–41)
MCH RBC QN AUTO: 29.3 PG (ref 27–33)
MCHC RBC AUTO-ENTMCNC: 34.5 G/DL (ref 33.6–35)
MCV RBC AUTO: 84.7 FL (ref 81.4–97.8)
MONOCYTES # BLD AUTO: 0.37 K/UL (ref 0–0.85)
MONOCYTES NFR BLD AUTO: 6 % (ref 0–13.4)
NEUTROPHILS # BLD AUTO: 4.17 K/UL (ref 2–7.15)
NEUTROPHILS NFR BLD: 67.5 % (ref 44–72)
NRBC # BLD AUTO: 0 K/UL
NRBC BLD-RTO: 0 /100 WBC
NST ACOUSTIC STIMULATION: NORMAL
NST ACTION NECESSARY: NORMAL
NST ASSESSMENT: NORMAL
NST BASELINE: NORMAL
NST INDICATIONS: NORMAL
NST OTHER DATA: NORMAL
NST READ BY: NORMAL
NST RETURN: NORMAL
NST UTERINE ACTIVITY: NORMAL
PLATELET # BLD AUTO: 222 K/UL (ref 164–446)
PMV BLD AUTO: 11.2 FL (ref 9–12.9)
RBC # BLD AUTO: 4.58 M/UL (ref 4.2–5.4)
T PALLIDUM AB SER QL IA: NORMAL
WBC # BLD AUTO: 6.2 K/UL (ref 4.8–10.8)

## 2023-01-03 PROCEDURE — 86780 TREPONEMA PALLIDUM: CPT

## 2023-01-03 PROCEDURE — 700105 HCHG RX REV CODE 258

## 2023-01-03 PROCEDURE — 302449 STATCHG TRIAGE ONLY (STATISTIC)

## 2023-01-03 PROCEDURE — 700105 HCHG RX REV CODE 258: Performed by: OBSTETRICS & GYNECOLOGY

## 2023-01-03 PROCEDURE — 700111 HCHG RX REV CODE 636 W/ 250 OVERRIDE (IP)

## 2023-01-03 PROCEDURE — 82962 GLUCOSE BLOOD TEST: CPT | Mod: 91

## 2023-01-03 PROCEDURE — 0502F SUBSEQUENT PRENATAL CARE: CPT | Performed by: OBSTETRICS & GYNECOLOGY

## 2023-01-03 PROCEDURE — 87150 DNA/RNA AMPLIFIED PROBE: CPT

## 2023-01-03 PROCEDURE — 87081 CULTURE SCREEN ONLY: CPT

## 2023-01-03 PROCEDURE — 700101 HCHG RX REV CODE 250: Performed by: OBSTETRICS & GYNECOLOGY

## 2023-01-03 PROCEDURE — 36415 COLL VENOUS BLD VENIPUNCTURE: CPT

## 2023-01-03 PROCEDURE — 770002 HCHG ROOM/CARE - OB PRIVATE (112)

## 2023-01-03 PROCEDURE — 85025 COMPLETE CBC W/AUTO DIFF WBC: CPT

## 2023-01-03 PROCEDURE — 59025 FETAL NON-STRESS TEST: CPT | Performed by: OBSTETRICS & GYNECOLOGY

## 2023-01-03 PROCEDURE — 87653 STREP B DNA AMP PROBE: CPT

## 2023-01-03 PROCEDURE — 700111 HCHG RX REV CODE 636 W/ 250 OVERRIDE (IP): Performed by: OBSTETRICS & GYNECOLOGY

## 2023-01-03 RX ORDER — SODIUM CHLORIDE, SODIUM LACTATE, POTASSIUM CHLORIDE, CALCIUM CHLORIDE 600; 310; 30; 20 MG/100ML; MG/100ML; MG/100ML; MG/100ML
INJECTION, SOLUTION INTRAVENOUS CONTINUOUS
Status: DISCONTINUED | OUTPATIENT
Start: 2023-01-03 | End: 2023-01-06 | Stop reason: HOSPADM

## 2023-01-03 RX ORDER — OXYTOCIN 10 [USP'U]/ML
10 INJECTION, SOLUTION INTRAMUSCULAR; INTRAVENOUS
Status: COMPLETED | OUTPATIENT
Start: 2023-01-03 | End: 2023-01-04

## 2023-01-03 RX ORDER — MISOPROSTOL 200 UG/1
800 TABLET ORAL
Status: COMPLETED | OUTPATIENT
Start: 2023-01-03 | End: 2023-01-04

## 2023-01-03 RX ORDER — ACETAMINOPHEN 500 MG
1000 TABLET ORAL
Status: COMPLETED | OUTPATIENT
Start: 2023-01-03 | End: 2023-01-04

## 2023-01-03 RX ORDER — LIDOCAINE HYDROCHLORIDE 10 MG/ML
20 INJECTION, SOLUTION INFILTRATION; PERINEURAL
Status: COMPLETED | OUTPATIENT
Start: 2023-01-03 | End: 2023-01-04

## 2023-01-03 RX ORDER — IBUPROFEN 800 MG/1
800 TABLET ORAL
Status: COMPLETED | OUTPATIENT
Start: 2023-01-03 | End: 2023-01-04

## 2023-01-03 RX ORDER — TERBUTALINE SULFATE 1 MG/ML
0.25 INJECTION, SOLUTION SUBCUTANEOUS
Status: DISCONTINUED | OUTPATIENT
Start: 2023-01-03 | End: 2023-01-04 | Stop reason: HOSPADM

## 2023-01-03 RX ADMIN — OXYTOCIN 2 MILLI-UNITS/MIN: 10 INJECTION, SOLUTION INTRAMUSCULAR; INTRAVENOUS at 17:08

## 2023-01-03 RX ADMIN — SODIUM CHLORIDE, POTASSIUM CHLORIDE, SODIUM LACTATE AND CALCIUM CHLORIDE: 600; 310; 30; 20 INJECTION, SOLUTION INTRAVENOUS at 17:07

## 2023-01-03 RX ADMIN — SODIUM CHLORIDE 5 MILLION UNITS: 900 INJECTION INTRAVENOUS at 17:08

## 2023-01-03 RX ADMIN — SODIUM CHLORIDE 2.5 MILLION UNITS: 9 INJECTION, SOLUTION INTRAVENOUS at 21:17

## 2023-01-03 ASSESSMENT — LIFESTYLE VARIABLES
AVERAGE NUMBER OF DAYS PER WEEK YOU HAVE A DRINK CONTAINING ALCOHOL: 0
HOW MANY TIMES IN THE PAST YEAR HAVE YOU HAD 5 OR MORE DRINKS IN A DAY: 0
TOTAL SCORE: 0
ALCOHOL_USE: NO
ON A TYPICAL DAY WHEN YOU DRINK ALCOHOL HOW MANY DRINKS DO YOU HAVE: 0
CONSUMPTION TOTAL: NEGATIVE
TOTAL SCORE: 0
TOTAL SCORE: 0
EVER_SMOKED: NEVER
HAVE YOU EVER FELT YOU SHOULD CUT DOWN ON YOUR DRINKING: NO
EVER HAD A DRINK FIRST THING IN THE MORNING TO STEADY YOUR NERVES TO GET RID OF A HANGOVER: NO
HAVE PEOPLE ANNOYED YOU BY CRITICIZING YOUR DRINKING: NO
EVER FELT BAD OR GUILTY ABOUT YOUR DRINKING: NO

## 2023-01-03 ASSESSMENT — PATIENT HEALTH QUESTIONNAIRE - PHQ9
SUM OF ALL RESPONSES TO PHQ9 QUESTIONS 1 AND 2: 0
2. FEELING DOWN, DEPRESSED, IRRITABLE, OR HOPELESS: NOT AT ALL
1. LITTLE INTEREST OR PLEASURE IN DOING THINGS: NOT AT ALL

## 2023-01-03 NOTE — H&P
OB H&P:    Patient is a direct admit from clinic     CC: LOF    HPI:  Ms. Christie Ang is a 32 y.o.  @ 36w2d by LMP confirmed with 11w0d US presents for LOF.     Patient states States she felt leaking of fluid around 5p yesterday 23 which continued today so she went to the clinic. At the clinic she was tested and the doctor confirmed her water had broken.     Contractions: No   Loss of fluid: Yes   Vaginal bleeding: No   Fetal movement: present       PNC with [facility]  PPROM  GDM A2  GBS Unknown   History of  labor (prior 32w0d)    PNL:  Blood Type B negative, Rubella immune, HIV neg, TrepAb neg, HBsAg NR, GC/CT neg/neg  1 HR GTT: 140  3 HR GTT: 142  GBS unknown    ROS:  Const: denies fevers, general concerns  CV/resp: reports no concerns  GI: denies abd pain, GI concerns  : see HPI  Neuro: denies HA/vision changes    OB History    Para Term  AB Living   2 1   1   1   SAB IAB Ectopic Molar Multiple Live Births             1      # Outcome Date GA Lbr Luis/2nd Weight Sex Delivery Anes PTL Lv   2 Current            1  09 32w0d  2 kg (4 lb 6.6 oz) M Vag-Spont   CHIOMA       GYN: denies STIs, no cervical procedures    No past medical history on file.    No past surgical history on file.    No current facility-administered medications on file prior to encounter.     Current Outpatient Medications on File Prior to Encounter   Medication Sig Dispense Refill    Prenatal MV-Min-Fe Fum-FA-DHA (PRENATAL 1 PO) Take  by mouth.         Family History   Problem Relation Age of Onset    Diabetes Mother     No Known Problems Father     No Known Problems Sister     No Known Problems Brother     No Known Problems Brother     No Known Problems Brother     No Known Problems Brother        Social History     Tobacco Use    Smoking status: Never    Smokeless tobacco: Never   Vaping Use    Vaping Use: Never used   Substance Use Topics    Alcohol use: Never    Drug use:  Never         PE:  There were no vitals filed for this visit.    GEN: AAO, NAD  HEENT: normocephalic, atraumatic, EOMI  CV: rrr, no m/r/g  Resp: CTAB, no w/r/r  Abd: soft, gravid, NT  Ext: NT, no peripheral edema  Derm: no visible lesions or rashes  Neuro: grossly intact    SVE: 3/60%/-2  FHT: Baseline 145/moderate variability/+ accels/ - decels  Berenice: Contractions absent    A/P: 32 y.o.  @ 36w2d by LMP confirmed with 11w0d US presents for PPROM. Patient Nitrazine positive in clinic. Admit for augmentation of labor.     PPROM at 36w2d  - Will admit for augmentation of labor   - Admit to L&D  - Vertex by BSUS   - Initiate routine intrapartum care  - Anticipate starting pitocin for augmentation   - Cat I tracing, continue to monitor EFM  - Epidural Anesthesia deferred, will continue to re-evaluate   - LR @ 125ml/h  GDM A2  - Most recently on 6u NPH qHs  - Will hold NPH for now, q4h blood sugar   GBS Unkown   - Patient without known allergies, will start Penicillin G   - GBS results pending   History of  labor (prior 32w0d)      Joan Cummings M.D.

## 2023-01-03 NOTE — PROGRESS NOTES
NST: baseline 130 with mdoerate variability, + accels, no decels. Reactive NST. Indication GDMA2.     Patient c/o LOF. +pooling, +nitrazine. Sent to labor and delivery. Attempted to call on call physician.

## 2023-01-03 NOTE — PROCEDURES
NST: baseline 130 with mdoerate variability, + accels, no decels. Reactive NST. Indication GDMA2.

## 2023-01-04 LAB
ACTION RH IMMUNE GLOB 8505RHG: NORMAL
ERYTHROCYTE [DISTWIDTH] IN BLOOD BY AUTOMATED COUNT: 41.9 FL (ref 35.9–50)
GLUCOSE BLD STRIP.AUTO-MCNC: 102 MG/DL (ref 65–99)
GLUCOSE BLD STRIP.AUTO-MCNC: 121 MG/DL (ref 65–99)
GP B STREP DNA SPEC QL NAA+PROBE: NEGATIVE
GP B STREP DNA SPEC QL NAA+PROBE: NEGATIVE
HCT VFR BLD AUTO: 31.7 % (ref 37–47)
HGB BLD-MCNC: 10.8 G/DL (ref 12–16)
IMMUNE ROSETTING TEST 8505FMH: NORMAL
MCH RBC QN AUTO: 29.7 PG (ref 27–33)
MCHC RBC AUTO-ENTMCNC: 34.1 G/DL (ref 33.6–35)
MCV RBC AUTO: 87.1 FL (ref 81.4–97.8)
NUMBER OF RH DOSES IND 8505RD: 1
PLATELET # BLD AUTO: 239 K/UL (ref 164–446)
PMV BLD AUTO: 11.1 FL (ref 9–12.9)
RBC # BLD AUTO: 3.64 M/UL (ref 4.2–5.4)
WBC # BLD AUTO: 9.9 K/UL (ref 4.8–10.8)

## 2023-01-04 PROCEDURE — A9270 NON-COVERED ITEM OR SERVICE: HCPCS | Performed by: OBSTETRICS & GYNECOLOGY

## 2023-01-04 PROCEDURE — 770002 HCHG ROOM/CARE - OB PRIVATE (112)

## 2023-01-04 PROCEDURE — 304965 HCHG RECOVERY SERVICES

## 2023-01-04 PROCEDURE — 700111 HCHG RX REV CODE 636 W/ 250 OVERRIDE (IP): Performed by: OBSTETRICS & GYNECOLOGY

## 2023-01-04 PROCEDURE — 700101 HCHG RX REV CODE 250: Performed by: OBSTETRICS & GYNECOLOGY

## 2023-01-04 PROCEDURE — 59410 OBSTETRICAL CARE: CPT | Performed by: NURSE PRACTITIONER

## 2023-01-04 PROCEDURE — 700102 HCHG RX REV CODE 250 W/ 637 OVERRIDE(OP): Performed by: OBSTETRICS & GYNECOLOGY

## 2023-01-04 PROCEDURE — 85461 HEMOGLOBIN FETAL: CPT

## 2023-01-04 PROCEDURE — 59409 OBSTETRICAL CARE: CPT

## 2023-01-04 PROCEDURE — 700105 HCHG RX REV CODE 258: Performed by: OBSTETRICS & GYNECOLOGY

## 2023-01-04 PROCEDURE — 85027 COMPLETE CBC AUTOMATED: CPT

## 2023-01-04 PROCEDURE — 36415 COLL VENOUS BLD VENIPUNCTURE: CPT

## 2023-01-04 PROCEDURE — 82962 GLUCOSE BLOOD TEST: CPT | Mod: 91

## 2023-01-04 RX ORDER — MISOPROSTOL 200 UG/1
600 TABLET ORAL
Status: DISCONTINUED | OUTPATIENT
Start: 2023-01-04 | End: 2023-01-06 | Stop reason: HOSPADM

## 2023-01-04 RX ORDER — DOCUSATE SODIUM 100 MG/1
100 CAPSULE, LIQUID FILLED ORAL 2 TIMES DAILY PRN
Status: DISCONTINUED | OUTPATIENT
Start: 2023-01-04 | End: 2023-01-06 | Stop reason: HOSPADM

## 2023-01-04 RX ORDER — METHYLERGONOVINE MALEATE 0.2 MG/ML
0.2 INJECTION INTRAVENOUS
Status: DISCONTINUED | OUTPATIENT
Start: 2023-01-04 | End: 2023-01-06 | Stop reason: HOSPADM

## 2023-01-04 RX ORDER — CARBOPROST TROMETHAMINE 250 UG/ML
250 INJECTION, SOLUTION INTRAMUSCULAR
Status: DISCONTINUED | OUTPATIENT
Start: 2023-01-04 | End: 2023-01-06 | Stop reason: HOSPADM

## 2023-01-04 RX ORDER — ACETAMINOPHEN 500 MG
1000 TABLET ORAL EVERY 6 HOURS PRN
Status: DISCONTINUED | OUTPATIENT
Start: 2023-01-04 | End: 2023-01-06 | Stop reason: HOSPADM

## 2023-01-04 RX ORDER — SODIUM CHLORIDE, SODIUM LACTATE, POTASSIUM CHLORIDE, CALCIUM CHLORIDE 600; 310; 30; 20 MG/100ML; MG/100ML; MG/100ML; MG/100ML
INJECTION, SOLUTION INTRAVENOUS PRN
Status: DISCONTINUED | OUTPATIENT
Start: 2023-01-04 | End: 2023-01-06 | Stop reason: HOSPADM

## 2023-01-04 RX ORDER — IBUPROFEN 800 MG/1
800 TABLET ORAL EVERY 8 HOURS PRN
Status: DISCONTINUED | OUTPATIENT
Start: 2023-01-04 | End: 2023-01-06 | Stop reason: HOSPADM

## 2023-01-04 RX ADMIN — OXYTOCIN 10 UNITS: 10 INJECTION, SOLUTION INTRAMUSCULAR; INTRAVENOUS at 01:04

## 2023-01-04 RX ADMIN — LIDOCAINE HYDROCHLORIDE 20 ML: 10 INJECTION, SOLUTION INFILTRATION; PERINEURAL at 00:57

## 2023-01-04 RX ADMIN — ACETAMINOPHEN 1000 MG: 500 TABLET ORAL at 03:26

## 2023-01-04 RX ADMIN — OXYTOCIN 125 ML/HR: 10 INJECTION, SOLUTION INTRAMUSCULAR; INTRAVENOUS at 05:42

## 2023-01-04 RX ADMIN — IBUPROFEN 800 MG: 800 TABLET, FILM COATED ORAL at 02:15

## 2023-01-04 RX ADMIN — MISOPROSTOL 800 MCG: 200 TABLET ORAL at 01:06

## 2023-01-04 RX ADMIN — OXYTOCIN 10 UNITS: 10 INJECTION, SOLUTION INTRAMUSCULAR; INTRAVENOUS at 04:41

## 2023-01-04 ASSESSMENT — PAIN DESCRIPTION - PAIN TYPE: TYPE: ACUTE PAIN

## 2023-01-04 NOTE — L&D DELIVERY NOTE
Christie Ang is a 32 y.o. female admitted from office for prolonged PPROM at 1700 on 2023.    Pregnancy complicated by: GBS unknown, Hx of PTD (prior 32w0d).    VAGINAL DELIVERY NOTE:    VE on admission: 3/60/-2  Labor course:Labor augmented by pitocin. Pt recd PCN x 2 doses for unknown GBS and prolonged ROM. Pt declined pain medication. Labor progressed normally to complete with effective pushing efforts    OB History    Para Term  AB Living   2 1   1   1   SAB IAB Ectopic Molar Multiple Live Births             1      # Outcome Date GA Lbr Luis/2nd Weight Sex Delivery Anes PTL Lv   2 Current            1  09 32w0d  2 kg (4 lb 6.6 oz) M Vag-Spont   CHIOMA       Weeks gestation: 36w3d  Diagnosis: , now    GBS: unknown    At 00:51,  of viable male infant over an intact perineum. Infant head delivered in BINDU with restitution to LOT. Nuchal cord present: no. Shoulders and rest of body delivered uneventfully with slight downward traction. Cord was delayed for 1 min, then clamped and cut when pulsations ceased.   Apgars: 8/9  Birth weight:pending skin to skin transition    Placenta: spontaneous and intact with 3 VC    Laceration: 2nd degree    Anesthesia: Local  Excellent hemostasis  EBL: 500 ml    Sponge and needle counts correct: yes    Tolerated procedure well. IV infiltrated, IM pitocin and 800 mcg Cytotec given.    Patient and infant will be transferred to postpartum unit to recover    Rayne Mcneil CNM, APRN  Dr Myrick is the attending MD today

## 2023-01-04 NOTE — PROGRESS NOTES
1940: Bedside report from Whit HARDING RN.    2146: Ipad translation used to assess contractions, pt states they are more painful.     2340: SVE per pt request    0025: SVE by this RN per pt request, complete- provider called    0051: Viable male infant delivered. APGARs 8/9    0705: Bedside report to Kendy JIANG. Relinquish care at this time.

## 2023-01-04 NOTE — PROGRESS NOTES
0705- Report received. Care assumed.   delivered early this am following IOL for GDM A2. Baby skin to skin with mother, no latch since delivery. FOB sleeping at bedside.   0715- Pt up to BR. Steady. + void. Geraldine care provided. Assisted back to bed.   0730- POC discussed through Translation Serviced Eyad Tubbs. Discussion including bleeding and bleeding precautions and mother's concerns over infant not eating. I explained there no concerns at this time although a breast pump has been ordered, blood sugars have been WDL,and we'll continue to work with mother and infant on PP. All questions answered.   0810- To PP via w/c. Report to APOLINAR Escobedo.

## 2023-01-04 NOTE — PROGRESS NOTES
Pt arrived to unit from the clinic for SROM. Denies VB or feeling regular/strong contractions at this time. Reports +FM. Monitors applied. Admission profile completed.     1602- Dr Cummings at bedside. Vertex presentation confirmed via US.     1625- SVE as charted.     1940- Report to Marbella ZEPEDA RN. Care relinquished.

## 2023-01-05 ENCOUNTER — APPOINTMENT (OUTPATIENT)
Dept: OBGYN | Facility: CLINIC | Age: 33
End: 2023-01-05

## 2023-01-05 PROCEDURE — A9270 NON-COVERED ITEM OR SERVICE: HCPCS | Performed by: NURSE PRACTITIONER

## 2023-01-05 PROCEDURE — 700102 HCHG RX REV CODE 250 W/ 637 OVERRIDE(OP): Performed by: NURSE PRACTITIONER

## 2023-01-05 PROCEDURE — 770002 HCHG ROOM/CARE - OB PRIVATE (112)

## 2023-01-05 RX ADMIN — DOCUSATE SODIUM 100 MG: 100 CAPSULE, LIQUID FILLED ORAL at 09:26

## 2023-01-05 ASSESSMENT — EDINBURGH POSTNATAL DEPRESSION SCALE (EPDS)
I HAVE BEEN SO UNHAPPY THAT I HAVE BEEN CRYING: NO, NEVER
I HAVE BEEN ABLE TO LAUGH AND SEE THE FUNNY SIDE OF THINGS: AS MUCH AS I ALWAYS COULD
I HAVE BLAMED MYSELF UNNECESSARILY WHEN THINGS WENT WRONG: NO, NEVER
I HAVE BEEN ANXIOUS OR WORRIED FOR NO GOOD REASON: NO, NOT AT ALL
I HAVE FELT SAD OR MISERABLE: NO, NOT AT ALL
I HAVE LOOKED FORWARD WITH ENJOYMENT TO THINGS: AS MUCH AS I EVER DID
I HAVE BEEN SO UNHAPPY THAT I HAVE HAD DIFFICULTY SLEEPING: NOT AT ALL
THE THOUGHT OF HARMING MYSELF HAS OCCURRED TO ME: NEVER
THINGS HAVE BEEN GETTING ON TOP OF ME: NO, I HAVE BEEN COPING AS WELL AS EVER
I HAVE FELT SCARED OR PANICKY FOR NO GOOD REASON: NO, NOT AT ALL

## 2023-01-05 NOTE — CARE PLAN
The patient is Stable - Low risk of patient condition declining or worsening    Shift Goals  Clinical Goals: Maintain fundus firm, lochia light; pain management    Progress made toward(s) clinical / shift goals:  Fundus firm, lochia light to scant; pt denied need for pain medication and used heat packs, prn, and perineal cold packs, prn.

## 2023-01-05 NOTE — CARE PLAN
The patient is Stable - Low risk of patient condition declining or worsening    Shift Goals  Clinical Goals: lochia WDL, breastfeed, monitor BS  Patient Goals: education, breastfeed  Family Goals: update on poc and support    Progress made toward(s) clinical / shift goals:  POC/medication discussed with pt via using . Educate MOB how to burp, breast pump and breastfeed/positioning. She verbalized understanding. Lochia WDL. No s/s of infection. Given MOB one dose of rhogam      Problem: Knowledge Deficit - Postpartum  Goal: Patient will verbalize and demonstrate understanding of self and infant care  Outcome: Progressing     Problem: Psychosocial - Postpartum  Goal: Patient will verbalize and demonstrate effective bonding and parenting behavior  Outcome: Progressing     Problem: Altered Physiologic Condition  Goal: Patient physiologically stable as evidenced by normal lochia, palpable uterine involution and vitals within normal limits  Outcome: Progressing     Problem: Infection - Postpartum  Goal: Postpartum patient will be free of signs and symptoms of infection  Outcome: Progressing

## 2023-01-05 NOTE — LACTATION NOTE
This note was copied from a baby's chart.  Initial Consult:    Consult done with  Toño Castano #[id number]      at 36+3d.  Started on 3 step plan d/t LPI status and poor latch.    MOB has been attempting to latch baby, but non sustained and quickly falls asleep. Supplementing with DMB according to supplemental feeding guidelines. Pumping with Ameda HG double pump at 80cpm decreasing 60cpm at 2min between 20-40% suction total time 15min.  Repeating every 3hrs. Using 25mm flanges.    Baby due to feed at time of consult, has been swaddled in crib, brought into football hold and hand expressed colostrum onto lips.Unable to awaken baby to feed, bottle DBM given.     Plan:  Increase skin to skin time while MOB awake and attentive. Breastfeed on cue, pace bottle feed according to volume and pump after.

## 2023-01-05 NOTE — PROGRESS NOTES
@ 0710: Report received from Shameka JIANG. Patient's awake and alert.     @ 0733: Clarified from Kaylan Pardo about the discontinuation of the post prandial and fasting check on patient since the order still active. Verbal order to discontinue the accu-check obtained.     @ 0754: Discussed plan of care to patient by the help of the  iPad (Tee #[id number]). Discussions include the following: use of call light and emergency cord; 3-steps plan on for baby; time of feeding and amount of DBM to be given; available Medication for pain; use of perineal bottle and the expected amount of bleeding and what to watch out for. Patient is aware that sugar check for her and  baby were done. Assessment done. Offered Motrin for cramping pain and with a pain level of 3 but refused and preferred to just use a heat pack. She denied difficulty in voiding and requested for a stool softener which this RN will provide. Will continue to monitor and provide care to patient.

## 2023-01-05 NOTE — PROGRESS NOTES
Post Partum Progress Note    Name:   Christie Ang   Date/Time:  2023 - 6:39 AM  Chief Admitting Dx:   premature rupture of membranes (PPROM) with unknown onset of labor [O42.919]  Delivery Type:  vaginal, spontaneous  Post-Op/Post Partum Days #:  1    Subjective:  Abdominal pain: no  Ambulating:   yes  Tolerating liquids:  yes  Tolerating food:  yes common adult  Flatus:   yes  BM:    no  Bleeding:   without any bleeding  Voiding:   yes  Dizziness:   no  Feeding:   both breast and bottle - donor breast milk    Vitals:    23 0840 23 1400 23 1800 23 0600   BP: 102/64 102/62 112/58 110/60   Pulse: 89 87 82 80   Resp: 18 18 18 18   Temp: 36.8 °C (98.2 °F) 36.6 °C (97.8 °F) 37.4 °C (99.3 °F) 36.8 °C (98.2 °F)   TempSrc: Temporal Temporal Temporal Temporal   SpO2: 97% 96% 97% 96%   Weight:       Height:           Exam:  Breast: Tenderness no, Engorged no, and Lactating yes  Abdomen: Abdomen soft, non-tender. BS normal. No masses,  No organomegaly  Fundal Tenderness:  no  Fundus Firm: yes  Incision: none  Below umbilicus: N\A  Perineum: perineal incision healing appropriately  Lochia: mild  Extremities: Normal, 1+ edema extremities, peripheral pulses and reflexes normal, no edema, redness or tenderness in the calves or thighs, Homans sign is negative, no sign of DVT, feet normal, good pulses, normal color, temperature and sensation    Meds:  Current Facility-Administered Medications   Medication Dose    lactated ringers infusion      docusate sodium (COLACE) capsule 100 mg  100 mg    ibuprofen (MOTRIN) tablet 800 mg  800 mg    acetaminophen (TYLENOL) tablet 1,000 mg  1,000 mg    PRN oxytocin (PITOCIN) (20 Units/1000 mL) PRN for excessive uterine bleeding - See Admin Instr  125-999 mL/hr    miSOPROStol (CYTOTEC) tablet 600 mcg  600 mcg    methylergonovine (METHERGINE) injection 0.2 mg  0.2 mg    carboPROST (HEMABATE) injection 250 mcg  250 mcg    LR infusion      oxytocin  (PITOCIN) infusion (for post delivery)  125 mL/hr    oxytocin (Pitocin) 0.02 Units/mL LR (induction of labor)  0.5-20 dexter-units/min       Labs:   Recent Labs     23  1620 23  1613   WBC 6.2 9.9   RBC 4.58 3.64*   HEMOGLOBIN 13.4 10.8*   HEMATOCRIT 38.8 31.7*   MCV 84.7 87.1   MCH 29.3 29.7   MCHC 34.5 34.1   RDW 39.4 41.9   PLATELETCT 222 239   MPV 11.2 11.1       Assessment:  Chief Admitting Dx:   premature rupture of membranes (PPROM) with unknown onset of labor [O42.919]  Delivery Type:  vaginal, spontaneous  Tubal Ligation:  N\A    Plan:  Continue routine post partum care.  Lactation support today  Anticipate d/c PPD#2    Kaylan Pardo C.N.M.

## 2023-01-05 NOTE — PROGRESS NOTES
0815- Patient arrived to Room S332.  Report received from TIMMY Villalobos, RN.  5895- Patient assessment done.  IV patent.  Patient declined offer for pain medication.  Patient given a heat pack for c/o lower abdomen/uterine cramping.  0857-  Via Dell, phone  #[phone], patient oriented to room and call system.  Reviewed plan of care.  Patient verbalized understanding.  1020- Via Dae, phone  #[phone], discussed with patient the use of a hand breast pump for a few minutes prior to feeding infant, to stimulate nipples to madelin.  Patient observed using hand breast pump correctly.  1030- Call placed to DYLAN Garcia, for a clarification regarding patient's blood sugar checks.  Awaiting call back.  1126- Received telephone order from DYLAN Garcia, to check blood sugars one hour postprandial and fasting.  Per Radha, she will place order.  1229- Patient up to bathroom and voided.  Pads changed.  Via Georges, phone  #[phone], patient denied dizziness.  1333- Via Bong, I-pad  #[id number], reviewed plan of care for the afternoon and reviewed the consent for donor breast milk which was provided to the patient in [place].  1407- Via Selena, I-pad  #[id number], patient instructed on use of electric breast pump.

## 2023-01-06 ENCOUNTER — PHARMACY VISIT (OUTPATIENT)
Dept: PHARMACY | Facility: MEDICAL CENTER | Age: 33
End: 2023-01-06
Payer: COMMERCIAL

## 2023-01-06 VITALS
HEART RATE: 86 BPM | OXYGEN SATURATION: 96 % | HEIGHT: 61 IN | WEIGHT: 192 LBS | RESPIRATION RATE: 18 BRPM | BODY MASS INDEX: 36.25 KG/M2 | DIASTOLIC BLOOD PRESSURE: 68 MMHG | SYSTOLIC BLOOD PRESSURE: 115 MMHG | TEMPERATURE: 97.5 F

## 2023-01-06 PROCEDURE — RXMED WILLOW AMBULATORY MEDICATION CHARGE

## 2023-01-06 RX ORDER — ACETAMINOPHEN 500 MG
1000 TABLET ORAL EVERY 6 HOURS PRN
Qty: 30 TABLET | Refills: 0 | Status: SHIPPED | OUTPATIENT
Start: 2023-01-06

## 2023-01-06 RX ORDER — IBUPROFEN 800 MG/1
800 TABLET ORAL EVERY 8 HOURS PRN
Qty: 30 TABLET | Refills: 0 | Status: SHIPPED | OUTPATIENT
Start: 2023-01-06

## 2023-01-06 RX ORDER — AMOXICILLIN 250 MG
1 CAPSULE ORAL DAILY
Qty: 14 TABLET | Refills: 0 | Status: SHIPPED | OUTPATIENT
Start: 2023-01-06

## 2023-01-06 NOTE — CARE PLAN
The patient is Stable - Low risk of patient condition declining or worsening    Shift Goals  Clinical Goals: Vital signs WNL, Fundus firm, lochia WNL  Patient Goals: rest; feed infant  Family Goals: update on poc and support    Progress made toward(s) clinical / shift goals:    Problem: Psychosocial - Postpartum  Goal: Patient will verbalize and demonstrate effective bonding and parenting behavior  Outcome: Progressing  Note: Patient verbalizes and demonstrates effective bonding and parenting behavior.      Problem: Altered Physiologic Condition  Goal: Patient physiologically stable as evidenced by normal lochia, palpable uterine involution and vitals within normal limits  Outcome: Progressing  Note: Patient is physiologically stable as evidenced by normal lochia, firm fundus, and vitals WNL.        Patient is not progressing towards the following goals:

## 2023-01-06 NOTE — PROGRESS NOTES
Assumed care. Assessment completed. Fundus firm, lochia light. Plan of Care reviewed in Uruguayan with . Denies pain at this time. Will call if pain intervention needed.

## 2023-01-06 NOTE — DISCHARGE INSTRUCTIONS

## 2023-01-06 NOTE — DISCHARGE SUMMARY
Discharge Summary:     Date of Admission: 1/3/2023  Date of Discharge: 23      Admitting diagnosis:    1. Pregnancy @ 36w3d  2. GDM A2      Discharge Diagnosis:   1. Status post vaginal, spontaneous.  2. GDM A2    History reviewed. No pertinent past medical history.  OB History    Para Term  AB Living   2 2   2   2   SAB IAB Ectopic Molar Multiple Live Births           0 2      # Outcome Date GA Lbr Luis/2nd Weight Sex Delivery Anes PTL Lv   2  23 36w3d / 00:26 3.065 kg (6 lb 12.1 oz) M Vag-Spont Local Y CHIOMA   1  09 32w0d  2 kg (4 lb 6.6 oz) M Vag-Spont   CHIOMA     History reviewed. No pertinent surgical history.  Patient has no known allergies.    Patient Active Problem List   Diagnosis    History of  delivery at 32 weeks    Rh negative status during pregnancy    Elevated glucose tolerance test    Supervision of other high risk pregnancies, third trimester     premature rupture of membranes (PPROM) with unknown onset of labor       Hospital Course:   Pt is a 32 y.o. now  who presented on 1/3/2023 for active labor. Epidural anesthesia was utilized with good effect on pain. Single male infant was delivered via  at 36w3d. Apgars 8, 9 at 1 and 5 minutes respectively.  ml.     Postpartum course notable for early ambulation, well managed pain, tolerance of diet, spontaneous voiding, and appropriate feeding of infant. She has remained afebrile and blood pressure has been well controlled. All maternal questions and concerns addressed    Physical Exam:  Temp:  [36.3 °C (97.4 °F)-36.7 °C (98 °F)] 36.7 °C (98 °F)  Pulse:  [72-79] 72  Resp:  [16-18] 18  BP: (105-109)/(62-65) 105/62  SpO2:  [96 %-97 %] 96 %  Physical Exam  General: well appearing, no apparent distress  Abdomen: soft, nontender, nondistended  Fundus: firm at level below umbilicus  Incision: not applicable, (vaginal delivery)  Perineum: Deferred  Extremities: symmetric, no peripheral  edema, calves nontender    Current Facility-Administered Medications   Medication Dose    lactated ringers infusion      docusate sodium (COLACE) capsule 100 mg  100 mg    ibuprofen (MOTRIN) tablet 800 mg  800 mg    acetaminophen (TYLENOL) tablet 1,000 mg  1,000 mg    PRN oxytocin (PITOCIN) (20 Units/1000 mL) PRN for excessive uterine bleeding - See Admin Instr  125-999 mL/hr    miSOPROStol (CYTOTEC) tablet 600 mcg  600 mcg    methylergonovine (METHERGINE) injection 0.2 mg  0.2 mg    carboPROST (HEMABATE) injection 250 mcg  250 mcg    LR infusion      oxytocin (PITOCIN) infusion (for post delivery)  125 mL/hr    oxytocin (Pitocin) 0.02 Units/mL LR (induction of labor)  0.5-20 dexter-units/min       Recent Labs     01/03/23  1620 01/04/23  1613   WBC 6.2 9.9   RBC 4.58 3.64*   HEMOGLOBIN 13.4 10.8*   HEMATOCRIT 38.8 31.7*   MCV 84.7 87.1   MCH 29.3 29.7   MCHC 34.5 34.1   RDW 39.4 41.9   PLATELETCT 222 239   MPV 11.2 11.1         Activity/ Discharge Instructions::   Discharge to home  Pelvic Rest x 6 weeks  No heavy lifting x4 weeks  Call or come to ED for: heavy vaginal bleeding, fever >100.4, severe abdominal pain, severe headache, chest pain, shortness of breath,  N/V, incisional drainage, or other concerns.       Follow up:  [facility] in 6 weeks for follow up on diabetes    Discharge Meds:   Current Outpatient Medications   Medication Sig Dispense Refill    acetaminophen (TYLENOL) 500 MG Tab Take 2 Tablets by mouth every 6 hours as needed for Mild Pain. 30 Tablet 0    sennosides-docusate sodium (SENOKOT-S) 8.6-50 MG tablet Take 1 Tablet by mouth every day. 14 Tablet 0    ibuprofen (MOTRIN) 800 MG Tab Take 1 Tablet by mouth every 8 hours as needed for Moderate Pain. 30 Tablet 0           Joan Cummings M.D.

## 2023-01-06 NOTE — PROGRESS NOTES
2000 Assumed care of patient at change of shift.  Discussed plan of care with patient in Puerto [place]ican and answered all questions.  Assessment completed.  Fundus firm, lochia light.  Patient will call if pain management intervention is needed.  Discussed paced feedings, comfort with pump settings, and supplemental information.

## 2023-01-06 NOTE — CARE PLAN
The patient is Stable - Low risk of patient condition declining or worsening    Shift Goals  Clinical Goals: pain management  Patient Goals: rest; feed infant    Progress made toward(s) clinical / shift goals:      Problem: Pain - Standard  Goal: Alleviation of pain or a reduction in pain to the patient’s comfort goal  Outcome: Progressing  Note: Patient's pain is well controlled.      Problem: Knowledge Deficit - Postpartum  Goal: Patient will verbalize and demonstrate understanding of self and infant care  Outcome: Progressing  Note: She's bonding well with infant and very independent in rendering care.        Patient is not progressing towards the following goals:

## 2023-01-06 NOTE — PROGRESS NOTES
Assumed care. Assessment completed. Fundus firm, lochia scant, rubra. Plan of Care reviewed. Denies pain at this time. Will call if pain intervention needed.

## 2023-01-07 NOTE — PROGRESS NOTES
Discharge paperwork discussed with MOB at bedside using . All questions answered. Follow-up appointment to be made by patient. NBS #2 dates given. Patient verbalized understanding.  APOLINAR Gallegos RN discussed with MOB the s/s of jaundice and when to bring infant to MD office/ER. MOB verbalized understanding. Paperwork signed and dated at this time.    1800- Infant discharged in car seat with parents. Infant placed in car seat by parents and checked by RN. Bands verified. Cuddles removed. Family escorted off unit with RN. All questions answered at this time.

## 2023-01-07 NOTE — PROGRESS NOTES
Meds-to-Beds: Discharge prescription orders listed below delivered to patient's bedside. APOLINAR Sherman notified. Patient counseled.      Current Outpatient Medications   Medication Sig Dispense Refill    acetaminophen (TYLENOL) 500 MG Tab Take 2 Tablets by mouth every 6 hours as needed for Mild Pain. 30 Tablet 0    senna-docusate (PERICOLACE OR SENOKOT S) 8.6-50 MG Tab Take 1 Tablet by mouth every day. 14 Tablet 0    ibuprofen (MOTRIN) 800 MG Tab Take 1 Tablet by mouth every 8 hours as needed for Moderate Pain. 30 Tablet 0      Emeterio Montes, Pharmacy Intern

## 2023-02-13 ASSESSMENT — EDINBURGH POSTNATAL DEPRESSION SCALE (EPDS)
I HAVE BEEN ANXIOUS OR WORRIED FOR NO GOOD REASON: NO, NOT AT ALL
I HAVE BEEN SO UNHAPPY THAT I HAVE BEEN CRYING: NO, NEVER
I HAVE FELT SAD OR MISERABLE: NO, NOT AT ALL
THE THOUGHT OF HARMING MYSELF HAS OCCURRED TO ME: NEVER
THINGS HAVE BEEN GETTING ON TOP OF ME: NO, I HAVE BEEN COPING AS WELL AS EVER
TOTAL SCORE: 0
I HAVE BEEN SO UNHAPPY THAT I HAVE HAD DIFFICULTY SLEEPING: NOT AT ALL
I HAVE LOOKED FORWARD WITH ENJOYMENT TO THINGS: AS MUCH AS I EVER DID
I HAVE FELT SCARED OR PANICKY FOR NO GOOD REASON: NO, NOT AT ALL
I HAVE BLAMED MYSELF UNNECESSARILY WHEN THINGS WENT WRONG: NO, NEVER
I HAVE BEEN ABLE TO LAUGH AND SEE THE FUNNY SIDE OF THINGS: AS MUCH AS I ALWAYS COULD

## 2023-02-16 ENCOUNTER — POST PARTUM (OUTPATIENT)
Dept: OBGYN | Facility: CLINIC | Age: 33
End: 2023-02-16

## 2023-02-16 VITALS — DIASTOLIC BLOOD PRESSURE: 64 MMHG | SYSTOLIC BLOOD PRESSURE: 110 MMHG | BODY MASS INDEX: 32.31 KG/M2 | WEIGHT: 171 LBS

## 2023-02-16 DIAGNOSIS — Z86.32 HISTORY OF INSULIN CONTROLLED GESTATIONAL DIABETES MELLITUS (GDM): ICD-10-CM

## 2023-02-16 PROBLEM — O42.919 PRETERM PREMATURE RUPTURE OF MEMBRANES (PPROM) WITH UNKNOWN ONSET OF LABOR: Status: RESOLVED | Noted: 2023-01-03 | Resolved: 2023-02-16

## 2023-02-16 PROBLEM — Z87.51 HISTORY OF PRETERM DELIVERY: Status: RESOLVED | Noted: 2022-09-08 | Resolved: 2023-02-16

## 2023-02-16 PROBLEM — O09.893 SUPERVISION OF OTHER HIGH RISK PREGNANCIES, THIRD TRIMESTER: Status: RESOLVED | Noted: 2022-11-03 | Resolved: 2023-02-16

## 2023-02-16 PROBLEM — O26.899 RH NEGATIVE STATUS DURING PREGNANCY: Status: RESOLVED | Noted: 2022-09-08 | Resolved: 2023-02-16

## 2023-02-16 PROBLEM — Z67.91 RH NEGATIVE STATUS DURING PREGNANCY: Status: RESOLVED | Noted: 2022-09-08 | Resolved: 2023-02-16

## 2023-02-16 PROCEDURE — 0503F POSTPARTUM CARE VISIT: CPT | Performed by: NURSE PRACTITIONER

## 2023-02-16 ASSESSMENT — ENCOUNTER SYMPTOMS
CARDIOVASCULAR NEGATIVE: 1
CONSTITUTIONAL NEGATIVE: 1
GASTROINTESTINAL NEGATIVE: 1
NEUROLOGICAL NEGATIVE: 1
EYES NEGATIVE: 1
RESPIRATORY NEGATIVE: 1
PSYCHIATRIC NEGATIVE: 1
MUSCULOSKELETAL NEGATIVE: 1

## 2023-02-16 ASSESSMENT — EDINBURGH POSTNATAL DEPRESSION SCALE (EPDS)
I HAVE BLAMED MYSELF UNNECESSARILY WHEN THINGS WENT WRONG: NO, NEVER
I HAVE FELT SCARED OR PANICKY FOR NO GOOD REASON: NO, NOT AT ALL
I HAVE BEEN ANXIOUS OR WORRIED FOR NO GOOD REASON: NO, NOT AT ALL
I HAVE BEEN SO UNHAPPY THAT I HAVE BEEN CRYING: NO, NEVER
THE THOUGHT OF HARMING MYSELF HAS OCCURRED TO ME: NEVER
I HAVE BEEN SO UNHAPPY THAT I HAVE HAD DIFFICULTY SLEEPING: NOT AT ALL
I HAVE FELT SAD OR MISERABLE: NO, NOT AT ALL
I HAVE LOOKED FORWARD WITH ENJOYMENT TO THINGS: AS MUCH AS I EVER DID
THINGS HAVE BEEN GETTING ON TOP OF ME: NO, I HAVE BEEN COPING AS WELL AS EVER
I HAVE BEEN ABLE TO LAUGH AND SEE THE FUNNY SIDE OF THINGS: AS MUCH AS I ALWAYS COULD
TOTAL SCORE: 0

## 2023-02-16 NOTE — PROGRESS NOTES
Pt here today for postpartum exam.  Delivery Date and Type  1/04 vaginal  Currently: bottle and breast feeing  BCM: no  Mood.good  LMP: n/a  Good ph:[phone] (home)

## 2023-02-16 NOTE — PROGRESS NOTES
Subjective:    Christie Ang is a 32 y.o. female who presents for her postpartum exam 5 weeks following . Her prenatal course was complicated by GDM A2 and PPROM. She is given 2 hour glucose test today to do. Education provided. She denies dysuria, vaginal bleeding, odor, itching or breast problems. She is pumping and feeding. She desires condoms for her birth control method. Reports no sex prior to this appointment. Eating a regular diet without difficulty. Bowel movement are Normal.  The patient is not having any pain. Stopped bleeding about 1 week ago without return of regular menses. Patient Denies Incisional pain, drainage or redness. Patient denies any s/sx of postpartum depression. EPDS today is 0.    Problem List     Patient Active Problem List    Diagnosis Date Noted    History of insulin controlled gestational diabetes mellitus (GDM) 10/31/2022       Objective    See PE  Lab: H&H at d/c: 10.8/31.7  /64   Wt 171 lb   LMP 2022   BMI 32.31 kg/m²     Assessment:    1. PP care of lactating women   2. Exam WNL   3. Pap WNL on 2021  4. Desires contraception - condoms      Plan:    1. Breastfeeding support   2. Continue PNV   3. Contraceptive counseling - follow up w health dept,  [facility], or [facility] for contraceptive changes, future pregnancy, or other women's health issues  4. Encouraged condom use for STI and pregnancy prevention  5. Discussed diet, exercise and resumption of sexual activity   6. Preconception guidance for next pregnancy if applicable. Discussed pregnancy spacing and  labor risk factors. Folic acid for all women of childbearing age.      HPI    Review of Systems   Constitutional: Negative.    HENT: Negative.     Eyes: Negative.    Respiratory: Negative.     Cardiovascular: Negative.    Gastrointestinal: Negative.    Genitourinary: Negative.    Musculoskeletal: Negative.    Skin: Negative.    Neurological: Negative.    Endo/Heme/Allergies:  Negative.    Psychiatric/Behavioral: Negative.     All other systems reviewed and are negative.        Objective     /64   Wt 171 lb   LMP 2022   BMI 32.31 kg/m²      Physical Exam  Vitals and nursing note reviewed. Exam conducted with a chaperone present.   Constitutional:       Appearance: Normal appearance.   HENT:      Head: Normocephalic.      Nose: Nose normal.   Eyes:      Extraocular Movements: Extraocular movements intact.   Cardiovascular:      Rate and Rhythm: Normal rate and regular rhythm.      Pulses: Normal pulses.      Heart sounds: Normal heart sounds.   Pulmonary:      Effort: Pulmonary effort is normal.      Breath sounds: Normal breath sounds.   Abdominal:      Palpations: Abdomen is soft.   Genitourinary:     General: Normal vulva.   Musculoskeletal:         General: Normal range of motion.      Cervical back: Normal range of motion.   Skin:     General: Skin is warm and dry.      Capillary Refill: Capillary refill takes less than 2 seconds.   Neurological:      General: No focal deficit present.      Mental Status: She is alert and oriented to person, place, and time.   Psychiatric:         Mood and Affect: Mood normal.         Behavior: Behavior normal.         Thought Content: Thought content normal.         Judgment: Judgment normal.           Assessment & Plan     1. Postpartum care following vaginal delivery   2023    2. History of insulin controlled gestational diabetes mellitus (GDM)  - GLUCOSE TOLERANCE 2 HOUR; Future

## 2025-02-18 ENCOUNTER — APPOINTMENT (OUTPATIENT)
Dept: OBGYN | Facility: CLINIC | Age: 35
End: 2025-02-18

## 2025-02-18 ENCOUNTER — HOSPITAL ENCOUNTER (OUTPATIENT)
Facility: MEDICAL CENTER | Age: 35
End: 2025-02-18
Attending: NURSE PRACTITIONER
Payer: COMMERCIAL

## 2025-02-18 ENCOUNTER — INITIAL PRENATAL (OUTPATIENT)
Dept: OBGYN | Facility: CLINIC | Age: 35
End: 2025-02-18

## 2025-02-18 VITALS — BODY MASS INDEX: 35.14 KG/M2 | DIASTOLIC BLOOD PRESSURE: 64 MMHG | SYSTOLIC BLOOD PRESSURE: 100 MMHG | WEIGHT: 186 LBS

## 2025-02-18 DIAGNOSIS — Z86.32 HISTORY OF INSULIN CONTROLLED GESTATIONAL DIABETES MELLITUS (GDM): ICD-10-CM

## 2025-02-18 DIAGNOSIS — O09.91 HIGH-RISK PREGNANCY IN FIRST TRIMESTER: ICD-10-CM

## 2025-02-18 DIAGNOSIS — Z87.51 HISTORY OF PRETERM DELIVERY: ICD-10-CM

## 2025-02-18 PROBLEM — O09.90 HIGH-RISK PREGNANCY: Status: ACTIVE | Noted: 2025-02-18

## 2025-02-18 PROCEDURE — 3074F SYST BP LT 130 MM HG: CPT | Performed by: NURSE PRACTITIONER

## 2025-02-18 PROCEDURE — 0500F INITIAL PRENATAL CARE VISIT: CPT | Performed by: NURSE PRACTITIONER

## 2025-02-18 PROCEDURE — 8198 PREG CTR PKG RATE (SYSTEM): Performed by: NURSE PRACTITIONER

## 2025-02-18 PROCEDURE — 3078F DIAST BP <80 MM HG: CPT | Performed by: NURSE PRACTITIONER

## 2025-02-18 ASSESSMENT — ENCOUNTER SYMPTOMS
NEUROLOGICAL NEGATIVE: 1
MUSCULOSKELETAL NEGATIVE: 1
PSYCHIATRIC NEGATIVE: 1
EYES NEGATIVE: 1
GASTROINTESTINAL NEGATIVE: 1
CONSTITUTIONAL NEGATIVE: 1
CARDIOVASCULAR NEGATIVE: 1
RESPIRATORY NEGATIVE: 1

## 2025-02-18 ASSESSMENT — EDINBURGH POSTNATAL DEPRESSION SCALE (EPDS)
I HAVE FELT SAD OR MISERABLE: NO, NOT AT ALL
I HAVE FELT SCARED OR PANICKY FOR NO GOOD REASON: NO, NOT AT ALL
TOTAL SCORE: 0
I HAVE BLAMED MYSELF UNNECESSARILY WHEN THINGS WENT WRONG: NO, NEVER
I HAVE LOOKED FORWARD WITH ENJOYMENT TO THINGS: AS MUCH AS I EVER DID
I HAVE BEEN SO UNHAPPY THAT I HAVE HAD DIFFICULTY SLEEPING: NOT AT ALL
I HAVE BEEN SO UNHAPPY THAT I HAVE BEEN CRYING: NO, NEVER
THINGS HAVE BEEN GETTING ON TOP OF ME: NO, I HAVE BEEN COPING AS WELL AS EVER
I HAVE BEEN ABLE TO LAUGH AND SEE THE FUNNY SIDE OF THINGS: AS MUCH AS I ALWAYS COULD
THE THOUGHT OF HARMING MYSELF HAS OCCURRED TO ME: NEVER
I HAVE BEEN ANXIOUS OR WORRIED FOR NO GOOD REASON: NO, NOT AT ALL

## 2025-02-18 NOTE — PROGRESS NOTES
NOB today   Last pregnancy patient was GDM A 2.  LMP: exact  Last pap: 5/20/2021= negative. Negative HPV.  Phone # [phone]  Pharmacy confirmed  On PNV  Genetic screening. AFP tetra next visit.   Flu vaccine offered and declined   No Vaginal symptoms.  EPDS=   No problems today

## 2025-02-18 NOTE — ASSESSMENT & PLAN NOTE
Orders:    PREG CNTR PRENATAL PN; Future    URINE DRUG SCREEN W/CONF (AR); Future    US-OB 2ND 3RD TRI COMPLETE; Future    GLUCOSE 1HR GESTATIONAL; Future    Chlamydia/GC, PCR (Genital/Anal swab); Future    Referral to Perinatology

## 2025-02-18 NOTE — PROGRESS NOTES
Subjective     S:  Christie Ang is a 34 y.o.  female  @ She is 11w4d with an RAYO of Estimated Date of Delivery: 25 based off of LMP who presents for her new OB exam.        No ER visits or previous care in this pregnancy. She reports no complaints. Reports no FM, VB, LOF, or cramping.  Denies dysuria, vaginal DC.  Pt is  and lives with family who is supportive. She is currently working, no heavy lifting, no chemical exposure.  Pregnancy is planned.  Desires genetic screening, will order AFP tetra when ppropriate.    OB/GYN Hx:   PTD at 34 wks, followed PTD at 36 wks.  History of fibroids, cervical, uterine or ovarian surgery: no  Last pap smear was   History of HSV I or II in self or partner: np  History of Thyroid problems: no  History of STIs in self or partner: no  History of tobacco, drug or alcohol use: no  History of depression or anxiety no  History of GDM A2    O:    Vitals:    25 0834   BP: 100/64   Weight: 186 lb    See H&P Prenatal Physical and Prenatal Vitals.              A:   1.  IUP @ 11w4d RAYO: Estimated Date of Delivery: 25 per LMP         2.  S=D        3.    Patient Active Problem List    Diagnosis Date Noted    High-risk pregnancy 2025    History of  delivery at 32 and 36 wks 2025    History of insulin controlled gestational diabetes mellitus (GDM) 10/31/2022         P:  1.  GC/CT today. Pap normal in .         2.  Prenatal labs, early 1 hr GTT and UDS ordered - lab slip given        3.  Discussed diet and exercise during pregnancy. Encouraged good nutrition, and daily exercise including walking or swimming. Discussed expected weight gain during pregnancy.              4.  Discussed adequate hydration during pregnancy.        5.  NOB packet given        6.  Return to office in 4 wks        7.  Complete OB US in 9 wks        8.  Pregnancy guide provided        9.  Referral sent to [facility] d/t hx of PTDs      10.  ASA 81 mg  daily recommended for PIH/PTL prophylaxis, discussed w pt. To begin taking in 1 wk.       HPI    Review of Systems   Constitutional: Negative.    HENT: Negative.     Eyes: Negative.    Respiratory: Negative.     Cardiovascular: Negative.    Gastrointestinal: Negative.    Genitourinary: Negative.    Musculoskeletal: Negative.    Skin: Negative.    Neurological: Negative.    Endo/Heme/Allergies: Negative.    Psychiatric/Behavioral: Negative.                Objective     /64   Wt 186 lb   LMP 2024 (Exact Date)   BMI 35.14 kg/m²      Physical Exam  Vitals and nursing note reviewed.   Constitutional:       Appearance: She is well-developed.   Cardiovascular:      Rate and Rhythm: Normal rate and regular rhythm.      Heart sounds: Normal heart sounds.   Pulmonary:      Effort: Pulmonary effort is normal.      Breath sounds: Normal breath sounds.   Abdominal:      Palpations: Abdomen is soft.   Genitourinary:     Vagina: Normal.      Comments: Uterus enlarged, c/w 11 wk ga  Musculoskeletal:         General: Normal range of motion.      Cervical back: Normal range of motion and neck supple.   Skin:     General: Skin is warm and dry.   Neurological:      Mental Status: She is alert and oriented to person, place, and time.      Deep Tendon Reflexes: Reflexes are normal and symmetric.   Psychiatric:         Behavior: Behavior normal.         Thought Content: Thought content normal.         Judgment: Judgment normal.      Comments: EPDS = not filled out, pt denies sxs of depression                                  Assessment & Plan  High-risk pregnancy in first trimester    Orders:    PREG CNTR PRENATAL PN; Future    URINE DRUG SCREEN W/CONF (AR); Future    US-OB 2ND 3RD TRI COMPLETE; Future    GLUCOSE 1HR GESTATIONAL; Future    Chlamydia/GC, PCR (Genital/Anal swab); Future    Referral to Perinatology    History of  delivery at 32 and 36 wks         History of insulin controlled gestational diabetes  mellitus (GDM)

## 2025-02-19 LAB
C TRACH DNA GENITAL QL NAA+PROBE: NEGATIVE
N GONORRHOEA DNA GENITAL QL NAA+PROBE: NEGATIVE
SPECIMEN SOURCE: NORMAL

## 2025-02-19 NOTE — Clinical Note
REFERRAL APPROVAL NOTICE         Sent on February 19, 2025                   Christie Ang  [address]  [address]  [place] [address]                   Dear Ms. Suhail Ang,    After a careful review of the medical information and benefit coverage, [facility] has processed your referral. See below for additional details.    If applicable, you must be actively enrolled with your insurance for coverage of the authorized service. If you have any questions regarding your coverage, please contact your insurance directly.    REFERRAL INFORMATION   Referral #:  [id number]  Referred-To Department    Referred-By Provider:  Maternal Fetal Medicine    PHILL Juares   Maternal Fetal Med      [address] [facility]  [address]  [place] [address]  [phone] [address], [address]  [place] [address]  [phone]    Referral Start Date:  02/18/2025  Referral End Date:   02/18/2026             SCHEDULING  If you do not already have an appointment, please call [phone] to make an appointment.     MORE INFORMATION  If you do not already have a [employer] account, sign up at: [email or web address]  You can access your medical information, make appointments, see lab results, billing information, and more.  If you have questions regarding this referral, please contact  the [facility] Referrals department at:             [phone]. Monday - Friday 8:00AM - 5:00PM.     Sincerely,    [facility]

## 2025-02-25 ENCOUNTER — APPOINTMENT (OUTPATIENT)
Dept: OBGYN | Facility: CLINIC | Age: 35
End: 2025-02-25

## 2025-03-11 ENCOUNTER — HOSPITAL ENCOUNTER (OUTPATIENT)
Dept: LAB | Facility: MEDICAL CENTER | Age: 35
End: 2025-03-11
Attending: NURSE PRACTITIONER
Payer: COMMERCIAL

## 2025-03-11 DIAGNOSIS — O09.91 HIGH-RISK PREGNANCY IN FIRST TRIMESTER: ICD-10-CM

## 2025-03-11 LAB
ABO GROUP BLD: NORMAL
BLD GP AB SCN SERPL QL: NORMAL
ERYTHROCYTE [DISTWIDTH] IN BLOOD BY AUTOMATED COUNT: 40.8 FL (ref 35.9–50)
GLUCOSE 1H P 50 G GLC PO SERPL-MCNC: 136 MG/DL (ref 70–139)
HBV SURFACE AG SER QL: NORMAL
HCT VFR BLD AUTO: 43.9 % (ref 37–47)
HCV AB SER QL: NORMAL
HGB BLD-MCNC: 14.7 G/DL (ref 12–16)
HIV 1+2 AB+HIV1 P24 AG SERPL QL IA: NORMAL
MCH RBC QN AUTO: 29.1 PG (ref 27–33)
MCHC RBC AUTO-ENTMCNC: 33.5 G/DL (ref 32.2–35.5)
MCV RBC AUTO: 86.8 FL (ref 81.4–97.8)
PLATELET # BLD AUTO: 248 K/UL (ref 164–446)
PMV BLD AUTO: 11.2 FL (ref 9–12.9)
RBC # BLD AUTO: 5.06 M/UL (ref 4.2–5.4)
RH BLD: NORMAL
RUBV AB SER QL: 31.9 IU/ML
T PALLIDUM AB SER QL IA: NORMAL
WBC # BLD AUTO: 5.9 K/UL (ref 4.8–10.8)

## 2025-03-12 ENCOUNTER — RESULTS FOLLOW-UP (OUTPATIENT)
Dept: OBGYN | Facility: CLINIC | Age: 35
End: 2025-03-12
Payer: MEDICAID

## 2025-03-13 LAB
AMPHET CTO UR CFM-MCNC: NEGATIVE NG/ML
BACTERIA UR CULT: NORMAL
BARBITURATES CTO UR CFM-MCNC: NEGATIVE NG/ML
BENZODIAZ CTO UR CFM-MCNC: NEGATIVE NG/ML
CANNABINOIDS CTO UR CFM-MCNC: NEGATIVE NG/ML
COCAINE CTO UR CFM-MCNC: NEGATIVE NG/ML
CREAT UR-MCNC: 137.4 MG/DL (ref 20–400)
DRUG COMMENT 753798: NORMAL
METHADONE CTO UR CFM-MCNC: NEGATIVE NG/ML
OPIATES CTO UR CFM-MCNC: NEGATIVE NG/ML
PCP CTO UR CFM-MCNC: NEGATIVE NG/ML
PROPOXYPH CTO UR CFM-MCNC: NEGATIVE NG/ML
SIGNIFICANT IND 70042: NORMAL
SITE SITE: NORMAL
SOURCE SOURCE: NORMAL

## 2025-03-15 NOTE — PROGRESS NOTES
S: Pt is a 34 y.o.  at 15w4d gestation here today for routine prenatal care.     Concerns today include:  Denies concerns    Denies: vaginal bleeding, pelvic and abdominal pain, cramping, contractions, leaking of fluid, urinary and vaginal symptoms    Pt reports fetal movement as Absent      O: /64   Wt 188 lb   LMP 2024 (Exact Date)   BMI 35.52 kg/m²    *see prenatal flowsheet*     Labs:     Prenatal labs: Completed and normal  GTT: early 1h 136   GBS: Not yet collected  Genetic testing: none   STI testing: negative    Ultrasound: none     A/P:     Problem List Items Addressed This Visit       High-risk pregnancy, second trimester    Pt is a 34 y.o.  at 15w4d gestation here today for routine prenatal care.     Discuss 2nd trimester warning signs  Address concerns: no concerns today  AFP tetra ordered - lab slip provided.  Anatomy scan scheduled.   ASA 81mg recommended, pt to start today. Rx sent.   RTC 4 wks           Relevant Medications    aspirin (ASA) 81 MG Chew Tab chewable tablet    Other Relevant Orders    AFP TETRA

## 2025-03-18 ENCOUNTER — HOSPITAL ENCOUNTER (OUTPATIENT)
Dept: LAB | Facility: MEDICAL CENTER | Age: 35
End: 2025-03-18
Payer: COMMERCIAL

## 2025-03-18 ENCOUNTER — ROUTINE PRENATAL (OUTPATIENT)
Dept: OBGYN | Facility: CLINIC | Age: 35
End: 2025-03-18

## 2025-03-18 VITALS — DIASTOLIC BLOOD PRESSURE: 64 MMHG | BODY MASS INDEX: 35.52 KG/M2 | SYSTOLIC BLOOD PRESSURE: 108 MMHG | WEIGHT: 188 LBS

## 2025-03-18 DIAGNOSIS — O09.92 HIGH-RISK PREGNANCY, SECOND TRIMESTER: ICD-10-CM

## 2025-03-18 PROCEDURE — 3078F DIAST BP <80 MM HG: CPT

## 2025-03-18 PROCEDURE — 0502F SUBSEQUENT PRENATAL CARE: CPT

## 2025-03-18 PROCEDURE — 3074F SYST BP LT 130 MM HG: CPT

## 2025-03-18 RX ORDER — ASPIRIN 81 MG/1
81 TABLET, CHEWABLE ORAL DAILY
Qty: 100 TABLET | Refills: 2 | Status: SHIPPED | OUTPATIENT
Start: 2025-03-18

## 2025-03-18 NOTE — ASSESSMENT & PLAN NOTE
Pt is a 34 y.o.  at 15w4d gestation here today for routine prenatal care.     Discuss 2nd trimester warning signs  Address concerns: no concerns today  AFP tetra ordered - lab slip provided.  Anatomy scan scheduled.   ASA 81mg recommended, pt to start today. Rx sent.   RTC 4 wks

## 2025-03-18 NOTE — PROGRESS NOTES
Pt here for a OB follow up   GA: 15w 4d    Pt states: no questions or concerns   No fetal movement yet  No VB, LOF, UC's.  Phone # [phone]  Pharmacy Verified.  Flu Vaccine: declined  AFP : pend

## 2025-03-20 ENCOUNTER — RESULTS FOLLOW-UP (OUTPATIENT)
Dept: OBGYN | Facility: CLINIC | Age: 35
End: 2025-03-20

## 2025-03-20 LAB
# FETUSES US: NORMAL
AFP MOM SERPL: 0.58
AFP SERPL-MCNC: 16 NG/ML
AGE - REPORTED: 35.4 YR
CURRENT SMOKER: NO
FAMILY MEMBER DISEASES HX: NO
GA METHOD: NORMAL
GA: NORMAL WK
HCG MOM SERPL: 0.92
HCG SERPL-ACNC: NORMAL IU/L
HX OF HEREDITARY DISORDERS: NO
IDDM PATIENT QL: NO
INHIBIN A MOM SERPL: 0.51
INHIBIN A SERPL-MCNC: 75 PG/ML
INTEGRATED SCN PATIENT-IMP: NORMAL
PATHOLOGY STUDY: NORMAL
SPECIMEN DRAWN SERPL: NORMAL
U ESTRIOL MOM SERPL: 0.57
U ESTRIOL SERPL-MCNC: 0.54 NG/ML

## 2025-04-18 ENCOUNTER — ROUTINE PRENATAL (OUTPATIENT)
Dept: OBGYN | Facility: CLINIC | Age: 35
End: 2025-04-18

## 2025-04-18 VITALS — SYSTOLIC BLOOD PRESSURE: 100 MMHG | WEIGHT: 188.6 LBS | DIASTOLIC BLOOD PRESSURE: 58 MMHG | BODY MASS INDEX: 35.64 KG/M2

## 2025-04-18 DIAGNOSIS — O09.92 HIGH-RISK PREGNANCY, SECOND TRIMESTER: ICD-10-CM

## 2025-04-18 PROCEDURE — 3078F DIAST BP <80 MM HG: CPT | Performed by: PHYSICIAN ASSISTANT

## 2025-04-18 PROCEDURE — 0502F SUBSEQUENT PRENATAL CARE: CPT | Performed by: PHYSICIAN ASSISTANT

## 2025-04-18 PROCEDURE — 3074F SYST BP LT 130 MM HG: CPT | Performed by: PHYSICIAN ASSISTANT

## 2025-04-18 NOTE — PROGRESS NOTES
Pt has no complaints with cramping, bleeding or pain. +FM but little. US 4/23 - pt aware. AFP wnl - pt aware. Pt weight stable, though pt states she is eating well due to hx GDM. RTC 4 wk or sooner prn.     Blood type confirmed x 2 as B neg - pt thought she had B pos.

## 2025-04-23 ENCOUNTER — ANCILLARY PROCEDURE (OUTPATIENT)
Dept: OBGYN | Facility: CLINIC | Age: 35
End: 2025-04-23
Attending: NURSE PRACTITIONER

## 2025-04-23 VITALS
HEART RATE: 83 BPM | WEIGHT: 189.8 LBS | BODY MASS INDEX: 34.93 KG/M2 | SYSTOLIC BLOOD PRESSURE: 101 MMHG | HEIGHT: 62 IN | DIASTOLIC BLOOD PRESSURE: 56 MMHG

## 2025-04-23 DIAGNOSIS — O09.91 HIGH-RISK PREGNANCY IN FIRST TRIMESTER: ICD-10-CM

## 2025-04-23 PROCEDURE — 76805 OB US >/= 14 WKS SNGL FETUS: CPT | Performed by: OBSTETRICS & GYNECOLOGY

## 2025-04-24 ENCOUNTER — RESULTS FOLLOW-UP (OUTPATIENT)
Dept: OBGYN | Facility: CLINIC | Age: 35
End: 2025-04-24

## 2025-05-16 ENCOUNTER — ROUTINE PRENATAL (OUTPATIENT)
Dept: OBGYN | Facility: CLINIC | Age: 35
End: 2025-05-16

## 2025-05-16 VITALS — DIASTOLIC BLOOD PRESSURE: 58 MMHG | BODY MASS INDEX: 35.52 KG/M2 | SYSTOLIC BLOOD PRESSURE: 110 MMHG | WEIGHT: 194.2 LBS

## 2025-05-16 DIAGNOSIS — O09.92 HIGH-RISK PREGNANCY, SECOND TRIMESTER: Primary | ICD-10-CM

## 2025-05-16 PROCEDURE — 0502F SUBSEQUENT PRENATAL CARE: CPT | Performed by: PHYSICIAN ASSISTANT

## 2025-05-16 PROCEDURE — 3074F SYST BP LT 130 MM HG: CPT | Performed by: PHYSICIAN ASSISTANT

## 2025-05-16 PROCEDURE — 3078F DIAST BP <80 MM HG: CPT | Performed by: PHYSICIAN ASSISTANT

## 2025-05-16 NOTE — PROGRESS NOTES
Pt. Here for OB/FU. Reports Good FM.   Good # [phone]  Pt states no complaints.   Pt given 1 HR GTT, RPR and CBC lab slip today along with instructions.

## 2025-05-16 NOTE — PROGRESS NOTES
Pt has no complaints with cramping, bleeding or pain. +FM. US wnl - pt notified of results. 1hr GTT, CBC, T pall labs given today with instructions - previous early 1hr . RTC 4 wk or sooner prn.

## 2025-06-12 ENCOUNTER — HOSPITAL ENCOUNTER (OUTPATIENT)
Dept: LAB | Facility: MEDICAL CENTER | Age: 35
End: 2025-06-12
Attending: PHYSICIAN ASSISTANT
Payer: COMMERCIAL

## 2025-06-12 DIAGNOSIS — O09.92 HIGH-RISK PREGNANCY, SECOND TRIMESTER: ICD-10-CM

## 2025-06-12 LAB
ERYTHROCYTE [DISTWIDTH] IN BLOOD BY AUTOMATED COUNT: 39.7 FL (ref 35.9–50)
GLUCOSE 1H P 50 G GLC PO SERPL-MCNC: 209 MG/DL (ref 70–139)
HCT VFR BLD AUTO: 40.7 % (ref 37–47)
HGB BLD-MCNC: 13.8 G/DL (ref 12–16)
MCH RBC QN AUTO: 29.8 PG (ref 27–33)
MCHC RBC AUTO-ENTMCNC: 33.9 G/DL (ref 32.2–35.5)
MCV RBC AUTO: 87.9 FL (ref 81.4–97.8)
PLATELET # BLD AUTO: 239 K/UL (ref 164–446)
PMV BLD AUTO: 10.7 FL (ref 9–12.9)
RBC # BLD AUTO: 4.63 M/UL (ref 4.2–5.4)
T PALLIDUM AB SER QL IA: NORMAL
WBC # BLD AUTO: 6.1 K/UL (ref 4.8–10.8)

## 2025-06-13 ENCOUNTER — RESULTS FOLLOW-UP (OUTPATIENT)
Dept: OBGYN | Facility: CLINIC | Age: 35
End: 2025-06-13

## 2025-06-13 DIAGNOSIS — O24.419 GESTATIONAL DIABETES MELLITUS (GDM) AFFECTING THIRD PREGNANCY: Primary | ICD-10-CM

## 2025-06-19 ENCOUNTER — ROUTINE PRENATAL (OUTPATIENT)
Dept: OBGYN | Facility: CLINIC | Age: 35
End: 2025-06-19

## 2025-06-19 VITALS — WEIGHT: 198 LBS | DIASTOLIC BLOOD PRESSURE: 64 MMHG | SYSTOLIC BLOOD PRESSURE: 108 MMHG | BODY MASS INDEX: 36.21 KG/M2

## 2025-06-19 DIAGNOSIS — O09.92 HIGH-RISK PREGNANCY, SECOND TRIMESTER: Primary | ICD-10-CM

## 2025-06-19 DIAGNOSIS — Z67.91 RH NEGATIVE STATE IN ANTEPARTUM PERIOD, THIRD TRIMESTER: ICD-10-CM

## 2025-06-19 DIAGNOSIS — O26.893 RH NEGATIVE STATE IN ANTEPARTUM PERIOD, THIRD TRIMESTER: ICD-10-CM

## 2025-06-19 DIAGNOSIS — O24.419 GESTATIONAL DIABETES MELLITUS (GDM) AFFECTING THIRD PREGNANCY: ICD-10-CM

## 2025-06-19 NOTE — PROGRESS NOTES
Pt. Here for OB/F/U, Kick Count sheet given and explained to pt.   Good FM  Good # [phone]   Pt states in [place] she was told she was B+ but here they have tested her twice and she is B-  Rhogham today   Tdap today   BTL offered and declined.   Pt informed of abnormal 1 hour abnormal and she will need to schedule for GDM class information given on glucometer, instructions given on HgbA1c.

## 2025-06-19 NOTE — LETTER
Cuente los Movimientos de nair Bebé  Otro paso importante para la iris de nair bebé    [facility]            Dept: [phone]    ¿Cuántas semanas tiene de embarazo? 28w6d    Fecha cuando tiene que comenzar a contar el movimiento: hoy                  Concord debe usar kenneth diagrama    Sharmila manera en que nair doctor puede controlar a iris de nair bebé es sabiendo cuantas veces se mueve nair bebé en el útero, o por medio de las “pataditas”.  Usted podrá ayudarle a nair médico al usar cada día el siguiente diagrama.    Cada día, usted debe prestar atención a cuantas horas le lleva a nair bebé moverse 10 veces.  Comience a contar en la mañana, lo antes posible después de haberse levantado.    Primeramente, escriba la hora en que se mueve nair bebé, hasta llegar a 10 veces.  Colóquele un check o palomita a cada cuadrito cada vez que nair bebé se mueva hasta que complete 10 veces.  Escriba la hora cuando termine de contar 10 veces en la última columna.  Sume el total del tiempo que le llevó contar los 10 movimientos.  Finalmente, complete el cuadrito de cuantas horas le llevó hacerlo.    Después de allison contado los 10 movimientos, ya no tendrá que contar los demás movimientos por el gerson del día.  A la mañana siguiente, comience a contar de nuevo cuantas veces se mueve el bebé desde el momento en que se levante.    ¿Qué tendría que considerarse un “movimiento”?  Es difícil de decirlo porque es distinto de sharmila madre a otra, y de un embarazo a otro.  Lo importante es que cuente el movimiento de la misma manera anthony el transcurso de nair embarazo.  Si tiene preguntas adicionales, pregúntele a nair doctor.    ¡Cuente cuidadosamente cada día!     MUESTRA:  Semana 28    ¿Cuántas horas le ha llevado sentir 10 movimientos?        Hora de Inicio     1     2     3     4     5     6     7     8     9     10   Hora de Finlizar   Dewayne. 8:20           11:40   Mar.               Mié.                Jue.               Vie.               Sáb.               Dom.                 IMPORTANTE:  Usted debe contactar a nair doctor si le lleva más de 2 horas sentir 10 movimientos de nair bebé.    Cada mañana, escriba la hora de inicio y comience a contar los movimientos de nair bebé.  Hágalo colocándole un check o palomita a cada cuadrito cada vez que sienta un movimiento de nair bebé.  Cuando haya sentido 10 “pataditas”, escriba la hora en que terminó de contar en la última columna.  Luego, complete en la cajita (arriba de la lobito de check o palomita) el número total de horas que le llevó hacerlo.  Asegúrese de leer completamente las instrucciones en la página anterior.

## 2025-06-19 NOTE — LETTER
June 19, 2025            Christie Ang is pregnant she was seen in our office on 2/18/2025 to establish Obstetric care, her estimated date of delivery is 9/5/2025.         Thank you,          NEEMA Salinas.    Electronically Signed

## 2025-06-19 NOTE — PROGRESS NOTES
Pt has no complaints with cramping, bleeding or pain. +FM - [facility] sheet given today. 1hr , though CBC, T pall wnl - pt aware of GDM, will make diabetic teaching class and GDM clinic f/u. HGA1C given today, diet reviewed with pt. Declines BTL. TDaP and Rhogam given today. US ordered for growth in 4 wks. RTC 1- 2wk at GDM clinic or sooner prn.

## 2025-06-19 NOTE — LETTER
June 19, 2025            Christie Ang is pregnant she was seen in our office on 2/18/2025 to establish Obstetric care, her estimated date of delivery is 9/5/2025. She is currently 28 weeks and 6 days as of today.        Thank you,          NEEMA Salinas.

## 2025-06-23 ENCOUNTER — TELEPHONE (OUTPATIENT)
Dept: OBGYN | Facility: CLINIC | Age: 35
End: 2025-06-23
Payer: MEDICAID

## 2025-06-23 NOTE — TELEPHONE ENCOUNTER
6/23/2025 0905   ID: [id number]  Called pt to confirm GDM class 6/24/2025 [id number]. Please eat breakfast before coming and bring all testing supplies and may bring 1 adult, but no children. Pt stated that she is coming.

## 2025-06-24 ENCOUNTER — NON-PROVIDER VISIT (OUTPATIENT)
Dept: OBGYN | Facility: CLINIC | Age: 35
End: 2025-06-24

## 2025-06-24 VITALS — DIASTOLIC BLOOD PRESSURE: 61 MMHG | HEART RATE: 74 BPM | SYSTOLIC BLOOD PRESSURE: 118 MMHG

## 2025-06-24 NOTE — PROGRESS NOTES
Christie attended [place] [employer] Diabetes class. Pt has type h/o GDM A2 x1. Family Hx of DM: Mother unknown type. Pt brought in a [employer] Premier glucose meter. Pt was taught verbally and hands on to use meter and finger stick done for a 175 blood sugar, 1.5 hours pp 2 flour tortilla quesadillas.   Activity: none.  Pt instructed to check blood sugar 4 times a day, Fasting and hour after each meal and advised to bring log book and glucose meter to each appt with provider.  Discussed what she needs to do after delivery for herself and family to limit risk for type two diabetes. All education and handouts given in [place].  Discuss with pt sources of simple sugars, sources of complex carbs, Carb portions, Protains and fats, plate distribution.  The pt received a 2000 calorie meal plan (with verification of Kayla's MEAGHAN CDE/RN) consisting of 3 meals and 3 snacks (see media for copy of meal plan).   Recommended meal times:   B: 0800  S: 1030  L: 1300  S: 1630  D: 2000  S: 2300  Pt was educated on carbohydrate containing foods vs non carbohydrate containing foods, the importance of small frequent meals, limiting carbohydrate to 1 serving in the morning, no fruit before noon/12pm, and avoiding all concentrated sweets for the remainder of her pregnancy. Explained the importance of not going >3hrs btwn eating during the day and no longer than 10hours overnight. Patient agrees to follow the meal plan and guidelines provided.   All handouts were given in [place].

## 2025-07-07 ENCOUNTER — HOSPITAL ENCOUNTER (OUTPATIENT)
Dept: LAB | Facility: MEDICAL CENTER | Age: 35
End: 2025-07-07
Attending: PHYSICIAN ASSISTANT
Payer: COMMERCIAL

## 2025-07-07 DIAGNOSIS — O24.419 GESTATIONAL DIABETES MELLITUS (GDM) AFFECTING THIRD PREGNANCY: ICD-10-CM

## 2025-07-07 LAB
EST. AVERAGE GLUCOSE BLD GHB EST-MCNC: 111 MG/DL
HBA1C MFR BLD: 5.5 % (ref 4–5.6)

## 2025-07-10 ENCOUNTER — ROUTINE PRENATAL (OUTPATIENT)
Dept: OBGYN | Facility: CLINIC | Age: 35
End: 2025-07-10

## 2025-07-10 VITALS — SYSTOLIC BLOOD PRESSURE: 102 MMHG | DIASTOLIC BLOOD PRESSURE: 56 MMHG | BODY MASS INDEX: 36.4 KG/M2 | WEIGHT: 199 LBS

## 2025-07-10 DIAGNOSIS — O24.419 GESTATIONAL DIABETES MELLITUS (GDM) AFFECTING THIRD PREGNANCY: Primary | ICD-10-CM

## 2025-07-10 LAB — GLUCOSE BLD-MCNC: 105 MG/DL (ref 65–99)

## 2025-07-10 PROCEDURE — 82962 GLUCOSE BLOOD TEST: CPT | Performed by: OBSTETRICS & GYNECOLOGY

## 2025-07-10 PROCEDURE — 59025 FETAL NON-STRESS TEST: CPT | Performed by: OBSTETRICS & GYNECOLOGY

## 2025-07-10 PROCEDURE — 3074F SYST BP LT 130 MM HG: CPT | Performed by: OBSTETRICS & GYNECOLOGY

## 2025-07-10 PROCEDURE — 3078F DIAST BP <80 MM HG: CPT | Performed by: OBSTETRICS & GYNECOLOGY

## 2025-07-10 PROCEDURE — 99213 OFFICE O/P EST LOW 20 MIN: CPT | Performed by: OBSTETRICS & GYNECOLOGY

## 2025-07-10 NOTE — PROGRESS NOTES
Insulin instructions given to pt on 7/10/25. Pt will start on 10 units of NPH, QHS. Pt instructed on how to draw up insulin, site selection and rotation, self injection technique, proper storage of disposal of syringes. Pt demonstrated back self injection technique successfully. Advised to follow really close her meal plan. Pt informed to place insulin in refrigerator, after opening insulin is good for 31days. Advised to write opened date on vial and discard after 31 days. Instruction booklet given. Will call back in case of any questions.

## 2025-07-10 NOTE — PROGRESS NOTES
SUBJECTIVE:  Christie is being seen today for her obstetrical visit.  She is 31 weeks 6 days gestation.  Her obstetrical history is significant for diabetes mellitus, gestational. Other risk factors include gestational diabetes in previous pregnancies requiring insulin. She endorses some abdominal discomfort from fetal movement but has no other concerns.     OBJECTIVE:    Fetal heart tones are at 130/minute.   NST:reactive, with accelerations.  Fasting blood sugars are running between 116 mg/dL - 136 mg/dL, mostly elevated.   Two-hour postprandial sugars are running about 50% at target goal.     Hemoglobin A1c 5.5% on 7/7/2025      ASSESSMENT/PLAN:  1. Intrauterine pregnancy of 31 weeks 6 days gestation.   2. Diabetes Mellitus with abnormal sugar control. Patient's fasting blood sugars are elevated, will begin 10 units of NPH at bedtime. Patient required insulin in previous pregnancy.   3.  NST: cat 1  4. US ultrasound ordered to assess fetal growth/development. Last US was on 4/32/2025.   4.  Follow up in one week.     I saw and examined the patient with the student and agree with the medical decision making in the note.     Jose Hawkins Jr., M.D.

## 2025-07-10 NOTE — PROGRESS NOTES
Pt. Here for OB F/U.   31w6d  Reports Good FM.   Pt. Denies VB, LOF, or UC's.     Pt states:   Pain on right side of abdomin.      Phone/Pharm verified.   [phone]    Diabetic Supplies: Has, does not need.     ACCUCHECK:  105 - pt last ate at 1 pm (sandwich with ham and veggies)    BS logs collected and scanned into pt chart.

## 2025-07-17 ENCOUNTER — OFFICE VISIT (OUTPATIENT)
Dept: OBGYN | Facility: CLINIC | Age: 35
End: 2025-07-17

## 2025-07-17 VITALS — BODY MASS INDEX: 36.03 KG/M2 | DIASTOLIC BLOOD PRESSURE: 58 MMHG | SYSTOLIC BLOOD PRESSURE: 125 MMHG | WEIGHT: 197 LBS

## 2025-07-17 DIAGNOSIS — Z86.32 HISTORY OF INSULIN CONTROLLED GESTATIONAL DIABETES MELLITUS (GDM): Primary | ICD-10-CM

## 2025-07-17 LAB — GLUCOSE BLD-MCNC: 93 MG/DL (ref 65–99)

## 2025-07-17 PROCEDURE — 82962 GLUCOSE BLOOD TEST: CPT

## 2025-07-17 PROCEDURE — 0502F SUBSEQUENT PRENATAL CARE: CPT

## 2025-07-17 PROCEDURE — 59025 FETAL NON-STRESS TEST: CPT

## 2025-07-17 NOTE — PROGRESS NOTES
Pt here today for OBFV   +FM movemnet  No VB, LOF. 's   Phone number [phone] (home)   Pharmacy verified   US 7/18  ACCU- 93  Pt has not had any breakfast this morning

## 2025-07-17 NOTE — PROGRESS NOTES
Christie Ang is a 35 y.o. female  at 32w6d    Pregnancy complicated by:  Problem List[1]      SUBJECTIVE:  Christie Ang is a 35 y.o.,  at 32w6d who presents for pregnancy follow-up. Good fetal movement.  Denies VB, LOF, CTX.    Pt in [facility] clinic today for GDM. Brings in sugar logs with high fastings and normal post prandials. Pt is eating a quesadilla and yogurt for her evening snack. Discussed that her tortilla may be effecting her morning sugars. Educated that she can have the cheese and nuts still or switch to avocados, cottage cheese, beef jerky, and no sugar greek yogurt.     OBJECTIVE:  Vital Signs: /58   Wt 197 lb   LMP 2024 (Exact Date)   BMI 36.03 kg/m²   Fundal height: 34cm, which is S>D.   Fetal heart tones: 130/minute.   Abdomen: soft, non-tender, gravid, no rashes, fetal heart tones are present, fundal height is not consistent with dates  Extremities: no edema     Average Range Targets   Fasting 112 101-123 <95   1 hr  115-129 <130     Med regimen:   QHS: 10 u NPH    PNV    - NST  Indication: GDMA2, AMA  NST Interpretation: Category 1  Baseline 130, reactive, Variability  6-25 BPM, Accelerations: Yes, Decelerations: No     - Growth US none since last visit. Growth US ordered      - TDaP: Administered at prior visit   - Rh: negative- immune globulin administered 2025    ASSESSMENT/PLAN:   - Intrauterine pregnancy of 32w6d weeks gestation, with poor growth.   - Diabetes Mellitus with poor sugar control.   - NST: reactive   - discussed with pt to eat an evening snack higher in protein and healthy fats. To decrease the carbs such as tortillas and use sugar free yogurt.    - Increase to 14u NPH qhs   - growth US ordered    Follow up in 1 week.     Call or return for vaginal bleeding, regular contractions, leakage of fluid or decreased fetal movement. Educated on labor precautions.    PHILL Gutierrez  [facility]    Maternal Fetal Medicine    I reviewed the case with Jose Hawkins MD [facility] who consulted and agrees with the plan.          [1]   Patient Active Problem List  Diagnosis    History of insulin controlled gestational diabetes mellitus (GDM)    High-risk pregnancy, second trimester    History of  delivery at 32 and 36 wks    Gestational diabetes mellitus (GDM) affecting third pregnancy - 1hr     Rh negative state in antepartum period, third trimester - Rhogam given 25

## 2025-07-18 ENCOUNTER — ANCILLARY PROCEDURE (OUTPATIENT)
Dept: OBGYN | Facility: CLINIC | Age: 35
End: 2025-07-18
Attending: PHYSICIAN ASSISTANT

## 2025-07-18 VITALS
DIASTOLIC BLOOD PRESSURE: 61 MMHG | SYSTOLIC BLOOD PRESSURE: 115 MMHG | HEIGHT: 61 IN | HEART RATE: 81 BPM | BODY MASS INDEX: 37.34 KG/M2 | WEIGHT: 197.8 LBS

## 2025-07-18 DIAGNOSIS — O24.419 GESTATIONAL DIABETES MELLITUS (GDM) AFFECTING THIRD PREGNANCY: ICD-10-CM

## 2025-07-18 PROCEDURE — 76816 OB US FOLLOW-UP PER FETUS: CPT | Performed by: OBSTETRICS & GYNECOLOGY

## 2025-07-24 ENCOUNTER — NON-PROVIDER VISIT (OUTPATIENT)
Dept: OBGYN | Facility: CLINIC | Age: 35
End: 2025-07-24

## 2025-07-24 ENCOUNTER — ROUTINE PRENATAL (OUTPATIENT)
Dept: OBGYN | Facility: CLINIC | Age: 35
End: 2025-07-24

## 2025-07-24 VITALS — DIASTOLIC BLOOD PRESSURE: 63 MMHG | SYSTOLIC BLOOD PRESSURE: 106 MMHG

## 2025-07-24 DIAGNOSIS — O24.419 GESTATIONAL DIABETES MELLITUS (GDM) AFFECTING THIRD PREGNANCY: Primary | ICD-10-CM

## 2025-07-24 LAB — GLUCOSE BLD-MCNC: 111 MG/DL (ref 65–99)

## 2025-07-24 PROCEDURE — 3078F DIAST BP <80 MM HG: CPT | Performed by: OBSTETRICS & GYNECOLOGY

## 2025-07-24 PROCEDURE — 99213 OFFICE O/P EST LOW 20 MIN: CPT | Performed by: OBSTETRICS & GYNECOLOGY

## 2025-07-24 PROCEDURE — 82962 GLUCOSE BLOOD TEST: CPT | Performed by: OBSTETRICS & GYNECOLOGY

## 2025-07-24 PROCEDURE — 3074F SYST BP LT 130 MM HG: CPT | Performed by: OBSTETRICS & GYNECOLOGY

## 2025-07-24 NOTE — PROGRESS NOTES
Pt here for diabetic OB exam 33w6d  Pt reports GOOD FM  Pt denies VB, LOF and UC  Pt confirms taking 14u NPH at [facility]  Good# [phone]  Pharmacy confirmed - [facility]  Diabetic supplies: Pt needs more needles  Random Accucheck: 111  Pt states she has not eaten anything yet today

## 2025-07-24 NOTE — PROGRESS NOTES
SUBJECTIVE:  Christie is , being seen today for her obstetrical visit.  She is 33w6d weeks gestation.  Her obstetrical history is significant for diabetes mellitus, gestational. Other risk factors include advanced maternal age, previous pregnancy requiring insulin. Christie reports no further compliants or concerns at this visit, endorses good fetal movement.     OBJECTIVE:  Fetal heart tones are at 140/minute.   NST:reactive, with accelerations, variable.  Fasting blood sugars are running between 100 to 113.  One-hour postprandial sugars are running between 96 to 130.  Last Hemoglobin A1C was normal at 5.5 (last check 2 weeks ago).      ASSESSMENT/PLAN:  1. Intrauterine pregnancy of 33w6d weeks gestation, with normal growth.  2. Diabetes Mellitus with poor fasting sugar control. Fasting glucose levels were elevated, patient told to increase morning insulin from 14U to 18U.  3.  NST: reactive  4.  Weekly NSTs if no complications present.   5. Anatomy Ultrasound scheduled for 08/15 to check for growth.

## 2025-07-31 ENCOUNTER — ROUTINE PRENATAL (OUTPATIENT)
Dept: OBGYN | Facility: CLINIC | Age: 35
End: 2025-07-31

## 2025-07-31 ENCOUNTER — NON-PROVIDER VISIT (OUTPATIENT)
Dept: OBGYN | Facility: CLINIC | Age: 35
End: 2025-07-31

## 2025-07-31 VITALS — WEIGHT: 199 LBS | BODY MASS INDEX: 37.6 KG/M2 | SYSTOLIC BLOOD PRESSURE: 117 MMHG | DIASTOLIC BLOOD PRESSURE: 64 MMHG

## 2025-07-31 DIAGNOSIS — O24.419 GESTATIONAL DIABETES MELLITUS (GDM) AFFECTING THIRD PREGNANCY: Primary | ICD-10-CM

## 2025-07-31 LAB — GLUCOSE BLD-MCNC: 125 MG/DL (ref 65–99)

## 2025-07-31 NOTE — PROGRESS NOTES
M Consult Progress Note     Christie Ang is a 35 y.o. female  at 34w6d    Pregnancy complicated by:  Problem List[1]      SUBJECTIVE:  Christie Ang is a 35 y.o.,  at 34w6d who presents for pregnancy follow-up. Good fetal movement.  Denies VB, LOF, CTX.    Pt in [facility] clinic today for GDM. Reports checking BG regularly though fasting levels have been consistently elevated. Eating high-protein bedtime snack--typically yogurt with no sugar--though levels are still high.    OBJECTIVE:  Vital Signs: /64   Wt 199 lb   LMP 2024 (Exact Date)   BMI 37.60 kg/m²   Fundal height: 34cm, which is S=D.   Fetal heart tones: RNST  Abdomen: soft, non-tender, gravid, no rashes, fetal heart tones are present, fundal height is consistent with dates  Extremities: no edema     Average Range Targets   Fasting 105 100-115 <95   1 hr  109-134 <130     Med regimen:   18 u NPH QHS    - NST  Indication: gestational diabetes mellitus  NST Interpretation: Category 1  Baseline 140, reactive, Variability  6-25 BPM, Accelerations: Yes, Decelerations: No     - Growth US  TIMOTEO: 10.9cm. EFW 2255g 63%.       - GBS: next visit   - Rh: negative    ASSESSMENT/PLAN:   - Intrauterine pregnancy of 34w6d weeks gestation, with normal growth.   - Diabetes Mellitus with poor sugar control; regimen changed from 18 u NPH QHS to 22 u NPH QHS   - NST: reactive      Follow up in 1 week.     Call or return for vaginal bleeding, regular contractions, leakage of fluid or decreased fetal movement. Educated on labor precautions.    Cony Tena C.N.M.  [facility]   Maternal Fetal Medicine     I reviewed the case with MD Delvis who consulted and agrees with the plan.          [1]   Patient Active Problem List  Diagnosis    History of insulin controlled gestational diabetes mellitus (GDM)    High-risk pregnancy, second trimester    History of  delivery at 32 and 36 wks    Gestational  diabetes mellitus (GDM) affecting third pregnancy - 1hr     Rh negative state in antepartum period, third trimester - Rhogam given 6/19/25

## 2025-07-31 NOTE — PROGRESS NOTES
Pt here today for OBFV   +FM movemnet  No VB, LOF. 's   Phone number [phone] (home)   Pharmacy verified   US 8/15  ACCU- 125  Pt had a torta de deana at 1:00

## 2025-08-07 ENCOUNTER — HOSPITAL ENCOUNTER (OUTPATIENT)
Facility: MEDICAL CENTER | Age: 35
End: 2025-08-07
Attending: PHYSICIAN ASSISTANT
Payer: MEDICAID

## 2025-08-07 ENCOUNTER — OFFICE VISIT (OUTPATIENT)
Dept: OBGYN | Facility: CLINIC | Age: 35
End: 2025-08-07
Payer: MEDICAID

## 2025-08-07 VITALS — DIASTOLIC BLOOD PRESSURE: 62 MMHG | SYSTOLIC BLOOD PRESSURE: 121 MMHG | BODY MASS INDEX: 37.79 KG/M2 | WEIGHT: 200 LBS

## 2025-08-07 DIAGNOSIS — O24.419 GESTATIONAL DIABETES MELLITUS (GDM) AFFECTING THIRD PREGNANCY: Primary | ICD-10-CM

## 2025-08-07 DIAGNOSIS — O24.419 GESTATIONAL DIABETES MELLITUS (GDM) AFFECTING THIRD PREGNANCY: ICD-10-CM

## 2025-08-07 LAB
GLUCOSE BLD-MCNC: 156 MG/DL (ref 65–99)
GLUCOSE BLD-MCNC: 157 MG/DL (ref 65–99)

## 2025-08-07 PROCEDURE — 82962 GLUCOSE BLOOD TEST: CPT | Performed by: PHYSICIAN ASSISTANT

## 2025-08-07 PROCEDURE — 87081 CULTURE SCREEN ONLY: CPT

## 2025-08-07 PROCEDURE — 87150 DNA/RNA AMPLIFIED PROBE: CPT

## 2025-08-09 LAB — GP B STREP DNA SPEC QL NAA+PROBE: NEGATIVE

## 2025-08-14 ENCOUNTER — ROUTINE PRENATAL (OUTPATIENT)
Dept: OBGYN | Facility: CLINIC | Age: 35
End: 2025-08-14

## 2025-08-14 ENCOUNTER — NON-PROVIDER VISIT (OUTPATIENT)
Dept: OBGYN | Facility: CLINIC | Age: 35
End: 2025-08-14

## 2025-08-14 ENCOUNTER — ANCILLARY PROCEDURE (OUTPATIENT)
Dept: OBGYN | Facility: CLINIC | Age: 35
End: 2025-08-14
Attending: OBSTETRICS & GYNECOLOGY

## 2025-08-14 VITALS
DIASTOLIC BLOOD PRESSURE: 64 MMHG | HEIGHT: 64 IN | WEIGHT: 200 LBS | BODY MASS INDEX: 34.15 KG/M2 | SYSTOLIC BLOOD PRESSURE: 115 MMHG

## 2025-08-14 VITALS — DIASTOLIC BLOOD PRESSURE: 64 MMHG | BODY MASS INDEX: 37.79 KG/M2 | WEIGHT: 200 LBS | SYSTOLIC BLOOD PRESSURE: 115 MMHG

## 2025-08-14 DIAGNOSIS — O09.519 AMA (ADVANCED MATERNAL AGE) PRIMIGRAVIDA 35+, UNSPECIFIED TRIMESTER: ICD-10-CM

## 2025-08-14 DIAGNOSIS — O24.419 GESTATIONAL DIABETES MELLITUS (GDM) AFFECTING THIRD PREGNANCY: Primary | ICD-10-CM

## 2025-08-14 LAB — GLUCOSE BLD-MCNC: 96 MG/DL (ref 65–99)

## 2025-08-14 RX ORDER — PRENATAL WITH FERROUS FUM AND FOLIC ACID 3080; 920; 120; 400; 22; 1.84; 3; 20; 10; 1; 12; 200; 27; 25; 2 [IU]/1; [IU]/1; MG/1; [IU]/1; MG/1; MG/1; MG/1; MG/1; MG/1; MG/1; UG/1; MG/1; MG/1; MG/1; MG/1
TABLET ORAL
Status: ON HOLD | COMMUNITY
End: 2025-08-28

## 2025-08-21 ENCOUNTER — NON-PROVIDER VISIT (OUTPATIENT)
Dept: OBGYN | Facility: CLINIC | Age: 35
End: 2025-08-21

## 2025-08-21 ENCOUNTER — APPOINTMENT (OUTPATIENT)
Dept: OBGYN | Facility: CLINIC | Age: 35
End: 2025-08-21
Attending: OBSTETRICS & GYNECOLOGY
Payer: MEDICAID

## 2025-08-21 ENCOUNTER — ROUTINE PRENATAL (OUTPATIENT)
Dept: OBGYN | Facility: CLINIC | Age: 35
End: 2025-08-21

## 2025-08-21 VITALS — SYSTOLIC BLOOD PRESSURE: 118 MMHG | BODY MASS INDEX: 34.84 KG/M2 | WEIGHT: 203 LBS | DIASTOLIC BLOOD PRESSURE: 71 MMHG

## 2025-08-21 DIAGNOSIS — O24.419 GESTATIONAL DIABETES MELLITUS (GDM) AFFECTING THIRD PREGNANCY: Primary | ICD-10-CM

## 2025-08-21 LAB — GLUCOSE BLD-MCNC: 108 MG/DL (ref 65–99)

## 2025-08-25 ENCOUNTER — APPOINTMENT (OUTPATIENT)
Dept: OBGYN | Facility: MEDICAL CENTER | Age: 35
End: 2025-08-25
Attending: STUDENT IN AN ORGANIZED HEALTH CARE EDUCATION/TRAINING PROGRAM
Payer: MEDICAID

## 2025-08-25 ENCOUNTER — HOSPITAL ENCOUNTER (INPATIENT)
Facility: MEDICAL CENTER | Age: 35
LOS: 3 days | End: 2025-08-28
Attending: STUDENT IN AN ORGANIZED HEALTH CARE EDUCATION/TRAINING PROGRAM | Admitting: STUDENT IN AN ORGANIZED HEALTH CARE EDUCATION/TRAINING PROGRAM
Payer: MEDICAID

## 2025-08-25 DIAGNOSIS — O09.92 HIGH-RISK PREGNANCY, SECOND TRIMESTER: Primary | ICD-10-CM

## 2025-08-25 LAB
GLUCOSE BLD STRIP.AUTO-MCNC: 163 MG/DL (ref 65–99)
HOLDING TUBE BB 8507: NORMAL

## 2025-08-25 PROCEDURE — 36415 COLL VENOUS BLD VENIPUNCTURE: CPT

## 2025-08-25 PROCEDURE — 700102 HCHG RX REV CODE 250 W/ 637 OVERRIDE(OP): Performed by: STUDENT IN AN ORGANIZED HEALTH CARE EDUCATION/TRAINING PROGRAM

## 2025-08-25 PROCEDURE — 85025 COMPLETE CBC W/AUTO DIFF WBC: CPT

## 2025-08-25 PROCEDURE — A9270 NON-COVERED ITEM OR SERVICE: HCPCS | Performed by: STUDENT IN AN ORGANIZED HEALTH CARE EDUCATION/TRAINING PROGRAM

## 2025-08-25 PROCEDURE — 82962 GLUCOSE BLOOD TEST: CPT | Performed by: STUDENT IN AN ORGANIZED HEALTH CARE EDUCATION/TRAINING PROGRAM

## 2025-08-25 PROCEDURE — 770002 HCHG ROOM/CARE - OB PRIVATE (112)

## 2025-08-25 PROCEDURE — 86780 TREPONEMA PALLIDUM: CPT

## 2025-08-25 RX ORDER — OXYTOCIN 10 [USP'U]/ML
10 INJECTION, SOLUTION INTRAMUSCULAR; INTRAVENOUS
Status: DISCONTINUED | OUTPATIENT
Start: 2025-08-25 | End: 2025-08-26 | Stop reason: HOSPADM

## 2025-08-25 RX ORDER — MISOPROSTOL 200 UG/1
800 TABLET ORAL
Status: DISCONTINUED | OUTPATIENT
Start: 2025-08-25 | End: 2025-08-26 | Stop reason: HOSPADM

## 2025-08-25 RX ORDER — ACETAMINOPHEN 500 MG
1000 TABLET ORAL
Status: DISCONTINUED | OUTPATIENT
Start: 2025-08-25 | End: 2025-08-26 | Stop reason: HOSPADM

## 2025-08-25 RX ORDER — TERBUTALINE SULFATE 1 MG/ML
0.25 INJECTION SUBCUTANEOUS
Status: DISCONTINUED | OUTPATIENT
Start: 2025-08-25 | End: 2025-08-26 | Stop reason: HOSPADM

## 2025-08-25 RX ORDER — IBUPROFEN 800 MG/1
800 TABLET, FILM COATED ORAL
Status: DISCONTINUED | OUTPATIENT
Start: 2025-08-25 | End: 2025-08-26 | Stop reason: HOSPADM

## 2025-08-25 RX ORDER — SODIUM CHLORIDE, SODIUM LACTATE, POTASSIUM CHLORIDE, CALCIUM CHLORIDE 600; 310; 30; 20 MG/100ML; MG/100ML; MG/100ML; MG/100ML
INJECTION, SOLUTION INTRAVENOUS CONTINUOUS
Status: DISCONTINUED | OUTPATIENT
Start: 2025-08-25 | End: 2025-08-26

## 2025-08-25 RX ORDER — ONDANSETRON 4 MG/1
4 TABLET, ORALLY DISINTEGRATING ORAL EVERY 6 HOURS PRN
Status: DISCONTINUED | OUTPATIENT
Start: 2025-08-25 | End: 2025-08-26 | Stop reason: HOSPADM

## 2025-08-25 RX ORDER — ALUMINA, MAGNESIA, AND SIMETHICONE 2400; 2400; 240 MG/30ML; MG/30ML; MG/30ML
30 SUSPENSION ORAL EVERY 6 HOURS PRN
Status: DISCONTINUED | OUTPATIENT
Start: 2025-08-25 | End: 2025-08-26 | Stop reason: HOSPADM

## 2025-08-25 RX ORDER — LIDOCAINE HYDROCHLORIDE 10 MG/ML
20 INJECTION, SOLUTION INFILTRATION; PERINEURAL
Status: DISCONTINUED | OUTPATIENT
Start: 2025-08-25 | End: 2025-08-26 | Stop reason: HOSPADM

## 2025-08-25 RX ORDER — ONDANSETRON 2 MG/ML
4 INJECTION INTRAMUSCULAR; INTRAVENOUS EVERY 6 HOURS PRN
Status: DISCONTINUED | OUTPATIENT
Start: 2025-08-25 | End: 2025-08-26 | Stop reason: HOSPADM

## 2025-08-25 RX ADMIN — MISOPROSTOL 25 MCG: 100 TABLET ORAL at 23:25

## 2025-08-25 ASSESSMENT — PAIN SCALES - GENERAL: PAINLEVEL: 0 - NO PAIN

## 2025-08-26 ENCOUNTER — ANESTHESIA (OUTPATIENT)
Dept: ANESTHESIOLOGY | Facility: MEDICAL CENTER | Age: 35
End: 2025-08-26
Payer: MEDICAID

## 2025-08-26 ENCOUNTER — ANESTHESIA EVENT (OUTPATIENT)
Dept: ANESTHESIOLOGY | Facility: MEDICAL CENTER | Age: 35
End: 2025-08-26
Payer: MEDICAID

## 2025-08-26 LAB
BASOPHILS # BLD AUTO: 0.2 % (ref 0–1.8)
BASOPHILS # BLD: 0.02 K/UL (ref 0–0.12)
EOSINOPHIL # BLD AUTO: 0.03 K/UL (ref 0–0.51)
EOSINOPHIL NFR BLD: 0.4 % (ref 0–6.9)
ERYTHROCYTE [DISTWIDTH] IN BLOOD BY AUTOMATED COUNT: 40.3 FL (ref 35.9–50)
GLUCOSE BLD STRIP.AUTO-MCNC: 113 MG/DL (ref 65–99)
GLUCOSE BLD STRIP.AUTO-MCNC: 124 MG/DL (ref 65–99)
HCT VFR BLD AUTO: 37.9 % (ref 37–47)
HGB BLD-MCNC: 13 G/DL (ref 12–16)
IMM GRANULOCYTES # BLD AUTO: 0.02 K/UL (ref 0–0.11)
IMM GRANULOCYTES NFR BLD AUTO: 0.2 % (ref 0–0.9)
LYMPHOCYTES # BLD AUTO: 2.18 K/UL (ref 1–4.8)
LYMPHOCYTES NFR BLD: 25.8 % (ref 22–41)
MCH RBC QN AUTO: 28.7 PG (ref 27–33)
MCHC RBC AUTO-ENTMCNC: 34.3 G/DL (ref 32.2–35.5)
MCV RBC AUTO: 83.7 FL (ref 81.4–97.8)
MONOCYTES # BLD AUTO: 0.61 K/UL (ref 0–0.85)
MONOCYTES NFR BLD AUTO: 7.2 % (ref 0–13.4)
NEUTROPHILS # BLD AUTO: 5.6 K/UL (ref 1.82–7.42)
NEUTROPHILS NFR BLD: 66.2 % (ref 44–72)
NRBC # BLD AUTO: 0 K/UL
NRBC BLD-RTO: 0 /100 WBC (ref 0–0.2)
PLATELET # BLD AUTO: 188 K/UL (ref 164–446)
PMV BLD AUTO: 12.2 FL (ref 9–12.9)
RBC # BLD AUTO: 4.53 M/UL (ref 4.2–5.4)
T PALLIDUM AB SER QL IA: NORMAL
WBC # BLD AUTO: 8.5 K/UL (ref 4.8–10.8)

## 2025-08-26 PROCEDURE — A9270 NON-COVERED ITEM OR SERVICE: HCPCS

## 2025-08-26 PROCEDURE — 700102 HCHG RX REV CODE 250 W/ 637 OVERRIDE(OP): Performed by: STUDENT IN AN ORGANIZED HEALTH CARE EDUCATION/TRAINING PROGRAM

## 2025-08-26 PROCEDURE — 304965 HCHG RECOVERY SERVICES

## 2025-08-26 PROCEDURE — 82962 GLUCOSE BLOOD TEST: CPT | Performed by: STUDENT IN AN ORGANIZED HEALTH CARE EDUCATION/TRAINING PROGRAM

## 2025-08-26 PROCEDURE — 700111 HCHG RX REV CODE 636 W/ 250 OVERRIDE (IP): Performed by: ADVANCED PRACTICE MIDWIFE

## 2025-08-26 PROCEDURE — 700105 HCHG RX REV CODE 258: Performed by: STUDENT IN AN ORGANIZED HEALTH CARE EDUCATION/TRAINING PROGRAM

## 2025-08-26 PROCEDURE — 700101 HCHG RX REV CODE 250: Performed by: STUDENT IN AN ORGANIZED HEALTH CARE EDUCATION/TRAINING PROGRAM

## 2025-08-26 PROCEDURE — 700105 HCHG RX REV CODE 258: Performed by: ADVANCED PRACTICE MIDWIFE

## 2025-08-26 PROCEDURE — 59409 OBSTETRICAL CARE: CPT

## 2025-08-26 PROCEDURE — 36415 COLL VENOUS BLD VENIPUNCTURE: CPT

## 2025-08-26 PROCEDURE — 700111 HCHG RX REV CODE 636 W/ 250 OVERRIDE (IP): Performed by: STUDENT IN AN ORGANIZED HEALTH CARE EDUCATION/TRAINING PROGRAM

## 2025-08-26 PROCEDURE — 59409 OBSTETRICAL CARE: CPT | Performed by: OBSTETRICS & GYNECOLOGY

## 2025-08-26 PROCEDURE — 770002 HCHG ROOM/CARE - OB PRIVATE (112)

## 2025-08-26 PROCEDURE — 700102 HCHG RX REV CODE 250 W/ 637 OVERRIDE(OP)

## 2025-08-26 PROCEDURE — 303615 HCHG EPIDURAL/SPINAL ANESTHESIA FOR LABOR

## 2025-08-26 RX ORDER — VITAMIN A ACETATE, BETA CAROTENE, ASCORBIC ACID, CHOLECALCIFEROL, .ALPHA.-TOCOPHEROL ACETATE, DL-, THIAMINE MONONITRATE, RIBOFLAVIN, NIACINAMIDE, PYRIDOXINE HYDROCHLORIDE, FOLIC ACID, CYANOCOBALAMIN, CALCIUM CARBONATE, FERROUS FUMARATE, ZINC OXIDE, CUPRIC OXIDE 3080; 12; 120; 400; 1; 1.84; 3; 20; 22; 920; 25; 200; 27; 10; 2 [IU]/1; UG/1; MG/1; [IU]/1; MG/1; MG/1; MG/1; MG/1; MG/1; [IU]/1; MG/1; MG/1; MG/1; MG/1; MG/1
1 TABLET, FILM COATED ORAL
Status: DISCONTINUED | OUTPATIENT
Start: 2025-08-26 | End: 2025-08-28 | Stop reason: HOSPADM

## 2025-08-26 RX ORDER — DOCUSATE SODIUM 100 MG/1
100 CAPSULE, LIQUID FILLED ORAL 2 TIMES DAILY PRN
Status: DISCONTINUED | OUTPATIENT
Start: 2025-08-26 | End: 2025-08-28 | Stop reason: HOSPADM

## 2025-08-26 RX ORDER — CALCIUM CARBONATE 500 MG/1
1000 TABLET, CHEWABLE ORAL EVERY 6 HOURS PRN
Status: DISCONTINUED | OUTPATIENT
Start: 2025-08-26 | End: 2025-08-28 | Stop reason: HOSPADM

## 2025-08-26 RX ORDER — ROPIVACAINE HYDROCHLORIDE 2 MG/ML
INJECTION, SOLUTION EPIDURAL; INFILTRATION
Status: COMPLETED | OUTPATIENT
Start: 2025-08-26 | End: 2025-08-26

## 2025-08-26 RX ORDER — SODIUM CHLORIDE, SODIUM LACTATE, POTASSIUM CHLORIDE, AND CALCIUM CHLORIDE .6; .31; .03; .02 G/100ML; G/100ML; G/100ML; G/100ML
1000 INJECTION, SOLUTION INTRAVENOUS
Status: DISCONTINUED | OUTPATIENT
Start: 2025-08-26 | End: 2025-08-26 | Stop reason: HOSPADM

## 2025-08-26 RX ORDER — INSULIN LISPRO 100 [IU]/ML
2-9 INJECTION, SOLUTION INTRAVENOUS; SUBCUTANEOUS
Status: DISCONTINUED | OUTPATIENT
Start: 2025-08-26 | End: 2025-08-26

## 2025-08-26 RX ORDER — IBUPROFEN 800 MG/1
800 TABLET, FILM COATED ORAL EVERY 8 HOURS PRN
Status: DISCONTINUED | OUTPATIENT
Start: 2025-08-26 | End: 2025-08-28 | Stop reason: HOSPADM

## 2025-08-26 RX ORDER — SIMETHICONE 125 MG
125 TABLET,CHEWABLE ORAL 4 TIMES DAILY PRN
Status: DISCONTINUED | OUTPATIENT
Start: 2025-08-26 | End: 2025-08-28 | Stop reason: HOSPADM

## 2025-08-26 RX ORDER — SODIUM CHLORIDE, SODIUM LACTATE, POTASSIUM CHLORIDE, CALCIUM CHLORIDE 600; 310; 30; 20 MG/100ML; MG/100ML; MG/100ML; MG/100ML
2000 INJECTION, SOLUTION INTRAVENOUS PRN
Status: DISCONTINUED | OUTPATIENT
Start: 2025-08-26 | End: 2025-08-28 | Stop reason: HOSPADM

## 2025-08-26 RX ORDER — SODIUM CHLORIDE, SODIUM LACTATE, POTASSIUM CHLORIDE, AND CALCIUM CHLORIDE .6; .31; .03; .02 G/100ML; G/100ML; G/100ML; G/100ML
250 INJECTION, SOLUTION INTRAVENOUS PRN
Status: DISCONTINUED | OUTPATIENT
Start: 2025-08-26 | End: 2025-08-26 | Stop reason: HOSPADM

## 2025-08-26 RX ORDER — ACETAMINOPHEN 500 MG
1000 TABLET ORAL EVERY 6 HOURS PRN
Status: DISCONTINUED | OUTPATIENT
Start: 2025-08-26 | End: 2025-08-28 | Stop reason: HOSPADM

## 2025-08-26 RX ORDER — EPHEDRINE SULFATE 50 MG/ML
5 INJECTION, SOLUTION INTRAVENOUS
Status: DISCONTINUED | OUTPATIENT
Start: 2025-08-26 | End: 2025-08-26 | Stop reason: HOSPADM

## 2025-08-26 RX ORDER — LIDOCAINE HYDROCHLORIDE AND EPINEPHRINE 15; 5 MG/ML; UG/ML
INJECTION, SOLUTION EPIDURAL
Status: COMPLETED | OUTPATIENT
Start: 2025-08-26 | End: 2025-08-26

## 2025-08-26 RX ORDER — ROPIVACAINE HYDROCHLORIDE 2 MG/ML
INJECTION, SOLUTION EPIDURAL; INFILTRATION; PERINEURAL CONTINUOUS
Status: DISCONTINUED | OUTPATIENT
Start: 2025-08-26 | End: 2025-08-28 | Stop reason: HOSPADM

## 2025-08-26 RX ORDER — DEXTROSE MONOHYDRATE 25 G/50ML
25 INJECTION, SOLUTION INTRAVENOUS
Status: DISCONTINUED | OUTPATIENT
Start: 2025-08-26 | End: 2025-08-26

## 2025-08-26 RX ORDER — BISACODYL 10 MG
10 SUPPOSITORY, RECTAL RECTAL PRN
Status: DISCONTINUED | OUTPATIENT
Start: 2025-08-26 | End: 2025-08-28 | Stop reason: HOSPADM

## 2025-08-26 RX ADMIN — IBUPROFEN 800 MG: 800 TABLET, FILM COATED ORAL at 16:16

## 2025-08-26 RX ADMIN — ROPIVACAINE HYDROCHLORIDE 5 ML: 2 INJECTION EPIDURAL; INFILTRATION; PERINEURAL at 10:23

## 2025-08-26 RX ADMIN — ACETAMINOPHEN 1000 MG: 500 TABLET ORAL at 16:16

## 2025-08-26 RX ADMIN — OXYTOCIN 0.67 MILLI-UNITS/MIN: 10 INJECTION, SOLUTION INTRAMUSCULAR; INTRAVENOUS at 04:10

## 2025-08-26 RX ADMIN — SODIUM CHLORIDE, POTASSIUM CHLORIDE, SODIUM LACTATE AND CALCIUM CHLORIDE 100 ML: 600; 310; 30; 20 INJECTION, SOLUTION INTRAVENOUS at 04:02

## 2025-08-26 RX ADMIN — ROPIVACAINE HYDROCHLORIDE: 2 INJECTION, SOLUTION EPIDURAL; INFILTRATION; PERINEURAL at 10:21

## 2025-08-26 RX ADMIN — OXYTOCIN 125 ML/HR: 10 INJECTION INTRAVENOUS at 13:29

## 2025-08-26 RX ADMIN — OXYTOCIN 20 UNITS: 10 INJECTION INTRAVENOUS at 11:55

## 2025-08-26 RX ADMIN — LIDOCAINE HYDROCHLORIDE,EPINEPHRINE BITARTRATE 5 ML: 15; .005 INJECTION, SOLUTION EPIDURAL; INFILTRATION; INTRACAUDAL; PERINEURAL at 10:20

## 2025-08-26 SDOH — ECONOMIC STABILITY: TRANSPORTATION INSECURITY
IN THE PAST 12 MONTHS, HAS THE LACK OF TRANSPORTATION KEPT YOU FROM MEDICAL APPOINTMENTS OR FROM GETTING MEDICATIONS?: NO

## 2025-08-26 SDOH — ECONOMIC STABILITY: TRANSPORTATION INSECURITY
IN THE PAST 12 MONTHS, HAS LACK OF RELIABLE TRANSPORTATION KEPT YOU FROM MEDICAL APPOINTMENTS, MEETINGS, WORK OR FROM GETTING THINGS NEEDED FOR DAILY LIVING?: NO

## 2025-08-26 ASSESSMENT — PAIN DESCRIPTION - PAIN TYPE
TYPE: ACUTE PAIN

## 2025-08-26 ASSESSMENT — SOCIAL DETERMINANTS OF HEALTH (SDOH)
IN THE PAST 12 MONTHS, HAS THE ELECTRIC, GAS, OIL, OR WATER COMPANY THREATENED TO SHUT OFF SERVICE IN YOUR HOME?: NO
WITHIN THE PAST 12 MONTHS, THE FOOD YOU BOUGHT JUST DIDN'T LAST AND YOU DIDN'T HAVE MONEY TO GET MORE: NEVER TRUE
WITHIN THE LAST YEAR, HAVE YOU BEEN AFRAID OF YOUR PARTNER OR EX-PARTNER?: NO
WITHIN THE LAST YEAR, HAVE YOU BEEN HUMILIATED OR EMOTIONALLY ABUSED IN OTHER WAYS BY YOUR PARTNER OR EX-PARTNER?: NO
WITHIN THE LAST YEAR, HAVE YOU BEEN KICKED, HIT, SLAPPED, OR OTHERWISE PHYSICALLY HURT BY YOUR PARTNER OR EX-PARTNER?: NO
WITHIN THE PAST 12 MONTHS, YOU WORRIED THAT YOUR FOOD WOULD RUN OUT BEFORE YOU GOT THE MONEY TO BUY MORE: NEVER TRUE
WITHIN THE LAST YEAR, HAVE TO BEEN RAPED OR FORCED TO HAVE ANY KIND OF SEXUAL ACTIVITY BY YOUR PARTNER OR EX-PARTNER?: NO

## 2025-08-26 ASSESSMENT — LIFESTYLE VARIABLES
ALCOHOL_USE: NO
HAVE YOU EVER FELT YOU SHOULD CUT DOWN ON YOUR DRINKING: NO
TOTAL SCORE: 0
CONSUMPTION TOTAL: INCOMPLETE
HAVE PEOPLE ANNOYED YOU BY CRITICIZING YOUR DRINKING: NO
EVER FELT BAD OR GUILTY ABOUT YOUR DRINKING: NO
TOTAL SCORE: 0
EVER HAD A DRINK FIRST THING IN THE MORNING TO STEADY YOUR NERVES TO GET RID OF A HANGOVER: NO
TOTAL SCORE: 0

## 2025-08-26 ASSESSMENT — PATIENT HEALTH QUESTIONNAIRE - PHQ9
2. FEELING DOWN, DEPRESSED, IRRITABLE, OR HOPELESS: NOT AT ALL
1. LITTLE INTEREST OR PLEASURE IN DOING THINGS: NOT AT ALL
SUM OF ALL RESPONSES TO PHQ9 QUESTIONS 1 AND 2: 0

## 2025-08-27 ENCOUNTER — TELEPHONE (OUTPATIENT)
Dept: OBGYN | Facility: CLINIC | Age: 35
End: 2025-08-27
Payer: MEDICAID

## 2025-08-27 LAB
ERYTHROCYTE [DISTWIDTH] IN BLOOD BY AUTOMATED COUNT: 41 FL (ref 35.9–50)
HCT VFR BLD AUTO: 32.6 % (ref 37–47)
HGB BLD-MCNC: 11.5 G/DL (ref 12–16)
IMMUNE ROSETTING TEST 8505FMH: NORMAL
MCH RBC QN AUTO: 29.6 PG (ref 27–33)
MCHC RBC AUTO-ENTMCNC: 35.3 G/DL (ref 32.2–35.5)
MCV RBC AUTO: 83.8 FL (ref 81.4–97.8)
NUMBER OF RH DOSES IND 8505RD: 1
PLATELET # BLD AUTO: 167 K/UL (ref 164–446)
PMV BLD AUTO: 11.8 FL (ref 9–12.9)
RBC # BLD AUTO: 3.89 M/UL (ref 4.2–5.4)
WBC # BLD AUTO: 6.1 K/UL (ref 4.8–10.8)

## 2025-08-27 PROCEDURE — 700102 HCHG RX REV CODE 250 W/ 637 OVERRIDE(OP)

## 2025-08-27 PROCEDURE — 36415 COLL VENOUS BLD VENIPUNCTURE: CPT

## 2025-08-27 PROCEDURE — 85027 COMPLETE CBC AUTOMATED: CPT

## 2025-08-27 PROCEDURE — 85461 HEMOGLOBIN FETAL: CPT

## 2025-08-27 PROCEDURE — A9270 NON-COVERED ITEM OR SERVICE: HCPCS

## 2025-08-27 PROCEDURE — 770002 HCHG ROOM/CARE - OB PRIVATE (112)

## 2025-08-27 PROCEDURE — 700111 HCHG RX REV CODE 636 W/ 250 OVERRIDE (IP): Performed by: OBSTETRICS & GYNECOLOGY

## 2025-08-27 RX ADMIN — IBUPROFEN 800 MG: 800 TABLET, FILM COATED ORAL at 20:52

## 2025-08-27 RX ADMIN — PRENATAL WITH FERROUS FUM AND FOLIC ACID 1 TABLET: 3080; 920; 120; 400; 22; 1.84; 3; 20; 10; 1; 12; 200; 27; 25; 2 TABLET ORAL at 08:58

## 2025-08-27 RX ADMIN — HUMAN RHO(D) IMMUNE GLOBULIN 300 MCG: 1500 SOLUTION INTRAMUSCULAR; INTRAVENOUS at 08:59

## 2025-08-27 RX ADMIN — IBUPROFEN 800 MG: 800 TABLET, FILM COATED ORAL at 01:53

## 2025-08-27 RX ADMIN — IBUPROFEN 800 MG: 800 TABLET, FILM COATED ORAL at 09:55

## 2025-08-27 ASSESSMENT — PAIN DESCRIPTION - PAIN TYPE
TYPE: ACUTE PAIN

## 2025-08-28 ENCOUNTER — PHARMACY VISIT (OUTPATIENT)
Dept: PHARMACY | Facility: MEDICAL CENTER | Age: 35
End: 2025-08-28
Payer: COMMERCIAL

## 2025-08-28 VITALS
DIASTOLIC BLOOD PRESSURE: 73 MMHG | HEIGHT: 64 IN | RESPIRATION RATE: 18 BRPM | WEIGHT: 203 LBS | OXYGEN SATURATION: 97 % | SYSTOLIC BLOOD PRESSURE: 115 MMHG | BODY MASS INDEX: 34.66 KG/M2 | TEMPERATURE: 97.6 F | HEART RATE: 73 BPM

## 2025-08-28 PROBLEM — Z86.32 HISTORY OF INSULIN CONTROLLED GESTATIONAL DIABETES MELLITUS (GDM): Status: RESOLVED | Noted: 2022-10-31 | Resolved: 2025-08-28

## 2025-08-28 PROBLEM — O24.419 GESTATIONAL DIABETES MELLITUS (GDM) AFFECTING THIRD PREGNANCY: Status: RESOLVED | Noted: 2025-06-13 | Resolved: 2025-08-28

## 2025-08-28 PROBLEM — O09.92 HIGH-RISK PREGNANCY, SECOND TRIMESTER: Status: RESOLVED | Noted: 2025-02-18 | Resolved: 2025-08-28

## 2025-08-28 PROCEDURE — RXMED WILLOW AMBULATORY MEDICATION CHARGE: Performed by: ADVANCED PRACTICE MIDWIFE

## 2025-08-28 PROCEDURE — A9270 NON-COVERED ITEM OR SERVICE: HCPCS

## 2025-08-28 PROCEDURE — 700102 HCHG RX REV CODE 250 W/ 637 OVERRIDE(OP)

## 2025-08-28 RX ORDER — ACETAMINOPHEN 500 MG
1000 TABLET ORAL EVERY 6 HOURS PRN
Qty: 30 TABLET | Refills: 0 | Status: SHIPPED | OUTPATIENT
Start: 2025-08-28

## 2025-08-28 RX ORDER — IBUPROFEN 800 MG/1
800 TABLET, FILM COATED ORAL EVERY 8 HOURS PRN
Qty: 30 TABLET | Refills: 0 | Status: SHIPPED | OUTPATIENT
Start: 2025-08-28

## 2025-08-28 RX ADMIN — PRENATAL WITH FERROUS FUM AND FOLIC ACID 1 TABLET: 3080; 920; 120; 400; 22; 1.84; 3; 20; 10; 1; 12; 200; 27; 25; 2 TABLET ORAL at 09:29

## 2025-08-28 ASSESSMENT — PAIN DESCRIPTION - PAIN TYPE
TYPE: ACUTE PAIN
TYPE: ACUTE PAIN

## 2025-08-28 ASSESSMENT — EDINBURGH POSTNATAL DEPRESSION SCALE (EPDS)
I HAVE BEEN SO UNHAPPY THAT I HAVE HAD DIFFICULTY SLEEPING: NOT AT ALL
I HAVE FELT SAD OR MISERABLE: NO, NOT AT ALL
I HAVE LOOKED FORWARD WITH ENJOYMENT TO THINGS: AS MUCH AS I EVER DID
I HAVE BEEN SO UNHAPPY THAT I HAVE BEEN CRYING: NO, NEVER
THINGS HAVE BEEN GETTING ON TOP OF ME: NO, I HAVE BEEN COPING AS WELL AS EVER
I HAVE BEEN ANXIOUS OR WORRIED FOR NO GOOD REASON: NO, NOT AT ALL
I HAVE BLAMED MYSELF UNNECESSARILY WHEN THINGS WENT WRONG: NO, NEVER
THE THOUGHT OF HARMING MYSELF HAS OCCURRED TO ME: NEVER
I HAVE BEEN ABLE TO LAUGH AND SEE THE FUNNY SIDE OF THINGS: AS MUCH AS I ALWAYS COULD
I HAVE FELT SCARED OR PANICKY FOR NO GOOD REASON: NO, NOT AT ALL